# Patient Record
Sex: FEMALE | Race: WHITE | NOT HISPANIC OR LATINO | Employment: FULL TIME | URBAN - METROPOLITAN AREA
[De-identification: names, ages, dates, MRNs, and addresses within clinical notes are randomized per-mention and may not be internally consistent; named-entity substitution may affect disease eponyms.]

---

## 2017-01-03 ENCOUNTER — TELEPHONE (OUTPATIENT)
Dept: INTERNAL MEDICINE | Facility: CLINIC | Age: 37
End: 2017-01-03

## 2017-01-03 NOTE — TELEPHONE ENCOUNTER
----- Message from Chery Gore sent at 1/3/2017  1:11 PM EST -----  Contact: self  Pt stopped in office with paperwork that needs to be filled out. She can be reached at [phone]  Placed in mailbox

## 2017-02-02 RX ORDER — FLUCONAZOLE 150 MG/1
150 TABLET ORAL ONCE
Qty: 2 TABLET | Refills: 2 | Status: SHIPPED | OUTPATIENT
Start: 2017-02-02 | End: 2017-02-02

## 2017-02-02 RX ORDER — METRONIDAZOLE 500 MG/1
500 TABLET ORAL 2 TIMES DAILY
Qty: 14 TABLET | Refills: 0 | Status: SHIPPED | OUTPATIENT
Start: 2017-02-02 | End: 2017-03-03 | Stop reason: SDUPTHER

## 2017-02-02 NOTE — TELEPHONE ENCOUNTER
----- Message from Irish Fowler sent at 2/2/2017  9:35 AM EST -----  Contact: MAIK GALLEGOS PH:[phone]  MAIK GALLEGOS CALLING TO SEE IF SHE CAN GET SOMETHING CALLED IN FOR A YEAST INFECTION. SHE USES THE [facility] AND CAN BE REACHED [phone]

## 2017-02-02 NOTE — TELEPHONE ENCOUNTER
Diflucan 150 mg PO X 1 dose if at 72 hours symptoms not resolved take second dose #2 with 2 refills.

## 2017-02-03 ENCOUNTER — TELEPHONE (OUTPATIENT)
Dept: INTERNAL MEDICINE | Facility: CLINIC | Age: 37
End: 2017-02-03

## 2017-02-03 NOTE — TELEPHONE ENCOUNTER
----- Message from Irish Fowler sent at 2/3/2017 10:00 AM EST -----  Contact: MAIK GALLEGOS PH:[phone]  MAIK GALLEGOS WOULD LIKE TO KNOW WHERE THE LEAVE OF ABSENCE FORMS THAT ARE READY FOR  CAME FROM. SHE SAID THE TWO SETS OF FORMS SHE DROPPED OF SHE HAS ALREADY GOTTEN AND WAS WONDERING IF THESE WERE DIFFERENT FORMS THAT WERE POSSIBLE FAXED IN FROM HER WORK. SHE CAN BE REACHED [phone]

## 2017-02-03 NOTE — TELEPHONE ENCOUNTER
Informed pt that Im not sure if these are duplicates or different forms, but they are ready for her to come .

## 2017-02-16 DIAGNOSIS — G89.29 CHRONIC LOW BACK PAIN, UNSPECIFIED BACK PAIN LATERALITY, WITH SCIATICA PRESENCE UNSPECIFIED: ICD-10-CM

## 2017-02-16 DIAGNOSIS — M54.5 CHRONIC LOW BACK PAIN, UNSPECIFIED BACK PAIN LATERALITY, WITH SCIATICA PRESENCE UNSPECIFIED: ICD-10-CM

## 2017-02-16 DIAGNOSIS — M79.7 FIBROMYALGIA: ICD-10-CM

## 2017-02-16 NOTE — TELEPHONE ENCOUNTER
----- Message from Cale Cartwright MA sent at 2/16/2017 10:15 AM EST -----  Contact: [phone]  Patient calling for refill on [facility]armacy- [facility]

## 2017-03-01 RX ORDER — ONDANSETRON 4 MG/1
4 TABLET, FILM COATED ORAL EVERY 8 HOURS PRN
Qty: 30 TABLET | Refills: 5 | Status: SHIPPED | OUTPATIENT
Start: 2017-03-01 | End: 2018-03-01

## 2017-03-03 ENCOUNTER — TELEPHONE (OUTPATIENT)
Dept: INTERNAL MEDICINE | Facility: CLINIC | Age: 37
End: 2017-03-03

## 2017-03-03 RX ORDER — METRONIDAZOLE 500 MG/1
500 TABLET ORAL 2 TIMES DAILY
Qty: 14 TABLET | Refills: 0 | Status: SHIPPED | OUTPATIENT
Start: 2017-03-03 | End: 2017-04-19

## 2017-03-03 RX ORDER — FLUCONAZOLE 150 MG/1
150 TABLET ORAL ONCE
Qty: 2 TABLET | Refills: 1 | Status: SHIPPED | OUTPATIENT
Start: 2017-03-03 | End: 2017-03-03

## 2017-03-03 NOTE — TELEPHONE ENCOUNTER
----- Message from Yokasta Hernandez sent at 3/3/2017  2:36 PM EST -----  PT THINKS SHE IS GETTING VAGINAL INFECTION AGAIN AND WOULD LIKE DIFLUCAN AND ANTIBIOTIC SENT TO [facility].      [phone]

## 2017-03-03 NOTE — TELEPHONE ENCOUNTER
IF yeast, just take Diflucan 150 mg PO Daily X 1 dose wait 3 days and if symptoms not resolved take 2nd dose #2 with 1 refill.     IF she is trying to say bacterial vaginosis:     Flagyl  500 mg PO BID X 7 days with no refills.

## 2017-03-06 ENCOUNTER — TELEPHONE (OUTPATIENT)
Dept: INTERNAL MEDICINE | Facility: CLINIC | Age: 37
End: 2017-03-06

## 2017-03-06 NOTE — TELEPHONE ENCOUNTER
----- Message from Yokasta Hernandez sent at 3/6/2017 10:28 AM EST -----  Pt has dropped off disability form that needs to be renewed by 3/8/17. She states that he work gave it to her on Thursday and she has been in hospital with her Grandfather, life has been hectic. It is placed in mailbox.      [phone] (pt)

## 2017-03-07 NOTE — TELEPHONE ENCOUNTER
Lmov for pt to return call, office number given, please let pt know that her disability forms are ready for her to .

## 2017-03-17 DIAGNOSIS — M79.7 FIBROMYALGIA: ICD-10-CM

## 2017-03-17 DIAGNOSIS — G89.29 CHRONIC LOW BACK PAIN, UNSPECIFIED BACK PAIN LATERALITY, WITH SCIATICA PRESENCE UNSPECIFIED: ICD-10-CM

## 2017-03-17 DIAGNOSIS — M54.5 CHRONIC LOW BACK PAIN, UNSPECIFIED BACK PAIN LATERALITY, WITH SCIATICA PRESENCE UNSPECIFIED: ICD-10-CM

## 2017-04-19 ENCOUNTER — OFFICE VISIT (OUTPATIENT)
Dept: INTERNAL MEDICINE | Facility: CLINIC | Age: 37
End: 2017-04-19

## 2017-04-19 VITALS
BODY MASS INDEX: 25.53 KG/M2 | DIASTOLIC BLOOD PRESSURE: 84 MMHG | WEIGHT: 163 LBS | SYSTOLIC BLOOD PRESSURE: 130 MMHG | HEART RATE: 86 BPM | RESPIRATION RATE: 16 BRPM

## 2017-04-19 DIAGNOSIS — F41.9 ANXIETY: ICD-10-CM

## 2017-04-19 DIAGNOSIS — M54.5 CHRONIC LOW BACK PAIN, UNSPECIFIED BACK PAIN LATERALITY, WITH SCIATICA PRESENCE UNSPECIFIED: ICD-10-CM

## 2017-04-19 DIAGNOSIS — G47.9 DIFFICULTY SLEEPING: ICD-10-CM

## 2017-04-19 DIAGNOSIS — G43.019 COMMON MIGRAINE WITH INTRACTABLE MIGRAINE: Primary | ICD-10-CM

## 2017-04-19 DIAGNOSIS — G89.29 CHRONIC LOW BACK PAIN, UNSPECIFIED BACK PAIN LATERALITY, WITH SCIATICA PRESENCE UNSPECIFIED: ICD-10-CM

## 2017-04-19 DIAGNOSIS — M79.7 FIBROMYALGIA: ICD-10-CM

## 2017-04-19 PROCEDURE — 99214 OFFICE O/P EST MOD 30 MIN: CPT | Performed by: INTERNAL MEDICINE

## 2017-04-19 NOTE — PROGRESS NOTES
Subjective   Melissa Hinton is a 37 y.o. female.   Pt is here to follow up on migraines,chronic back pain,fibromyalgia,anxiety and difficulty sleeping.               History of Present Illness   Pt is here to follow up on migraines,chronic back pain,fibromyalgia,anxiety and difficulty sleeping.   She states that all chronic issues are doing well with no acute complaints.               The following portions of the patient's history were reviewed and updated as appropriate: allergies, current medications, past family history, past medical history, past social history, past surgical history and problem list.             Review of Systems   Constitutional: Negative.    HENT: Negative.    Eyes: Negative.    Respiratory: Negative.    Cardiovascular: Negative.    Gastrointestinal: Negative.    Endocrine: Negative.    Genitourinary: Negative.    Musculoskeletal:        See HPI.    Skin: Negative.    Neurological:        See HPI.   Hematological: Negative.    Psychiatric/Behavioral:        See HPI.                 Objective   Physical Exam   Constitutional: She is oriented to person, place, and time. She appears well-developed and well-nourished.   HENT:   Head: Normocephalic and atraumatic.   Right Ear: External ear normal.   Left Ear: External ear normal.   Nose: Nose normal.   Mouth/Throat: Oropharynx is clear and moist.   Eyes: Conjunctivae and EOM are normal. Pupils are equal, round, and reactive to light.   Neck: Normal range of motion. Neck supple. No tracheal deviation present. No thyromegaly present.   Cardiovascular: Normal rate, regular rhythm, normal heart sounds and intact distal pulses.    Pulmonary/Chest: Effort normal and breath sounds normal.   Abdominal: Soft. Bowel sounds are normal. She exhibits no distension. There is no tenderness.   Neurological: She is alert and oriented to person, place, and time. She has normal reflexes.   Skin: Skin is warm and dry.   Psychiatric: She has a normal mood and affect.    Nursing note and vitals reviewed.             Assessment/Plan    Common migraine with intractable migraine    Chronic low back pain, unspecified back pain laterality, with sciatica presence unspecified  -     Cancel: Urine Drug Screen  -     pentazocine-naloxone (TALWIN NX) 50-0.5 MG per tablet; Take 1 tablet by mouth Every 6 (Six) Hours As Needed for Mild Pain (1-3).    Fibromyalgia  -     pentazocine-naloxone (TALWIN NX) 50-0.5 MG per tablet; Take 1 tablet by mouth Every 6 (Six) Hours As Needed for Mild Pain (1-3).    Anxiety  -     Cancel: Urine Drug Screen  -     Cancel: Urine Drug Screen    Difficulty sleeping      1.) Refill chronic meds   2.) UDS   3.) Follow up in 3 months

## 2017-05-18 ENCOUNTER — TELEPHONE (OUTPATIENT)
Dept: INTERNAL MEDICINE | Facility: CLINIC | Age: 37
End: 2017-05-18

## 2017-05-24 RX ORDER — PROPRANOLOL HYDROCHLORIDE 20 MG/1
TABLET ORAL
Qty: 90 TABLET | Refills: 3 | Status: SHIPPED | OUTPATIENT
Start: 2017-05-24 | End: 2017-11-07

## 2017-06-13 ENCOUNTER — TELEPHONE (OUTPATIENT)
Dept: INTERNAL MEDICINE | Facility: CLINIC | Age: 37
End: 2017-06-13

## 2017-06-13 DIAGNOSIS — M79.7 FIBROMYALGIA: ICD-10-CM

## 2017-06-13 DIAGNOSIS — G89.29 CHRONIC LOW BACK PAIN, UNSPECIFIED BACK PAIN LATERALITY, WITH SCIATICA PRESENCE UNSPECIFIED: ICD-10-CM

## 2017-06-13 DIAGNOSIS — M54.5 CHRONIC LOW BACK PAIN, UNSPECIFIED BACK PAIN LATERALITY, WITH SCIATICA PRESENCE UNSPECIFIED: ICD-10-CM

## 2017-06-13 NOTE — TELEPHONE ENCOUNTER
----- Message from Peg Steele sent at 6/13/2017  9:48 AM EDT -----  PT CALLING FOR RX REFILL pentazocine-naloxone (TALWIN NX) 50-0.5 MG per tablet    [phone]    [facility] - [address] - [phone] PH - [phone] FX

## 2017-06-20 ENCOUNTER — TELEPHONE (OUTPATIENT)
Dept: INTERNAL MEDICINE | Facility: CLINIC | Age: 37
End: 2017-06-20

## 2017-06-20 NOTE — TELEPHONE ENCOUNTER
S/W patient and informed her that we do not routinely call in antibiotics without being seen.  Patient states you have treated her in the past without being seen.  I reviewed the patient's chart and informed her that Dr. Nelson had treated her vaginosis and the medication she prescribed covered her sinus infection as well which she had previously been seen for in the office.  I informed the patient that one of the PA's could see her this evening but she insisted she was too busy to come in and be seen and she wanted to wait until Dr. Nelson returned to the office because she said she has treated her sinus infections in the past without being seen.

## 2017-06-20 NOTE — TELEPHONE ENCOUNTER
----- Message from Stefany Kelly sent at 6/20/2017 12:49 PM EDT -----  Pt [phone]  PT HAS A SINUS INFECTION AND WOULD LIKE AN ANTIBIOTIC CALLED IN AND ALSO JOSHUA CALLED IN TO [facility] [place]

## 2017-07-10 ENCOUNTER — TELEPHONE (OUTPATIENT)
Dept: INTERNAL MEDICINE | Facility: CLINIC | Age: 37
End: 2017-07-10

## 2017-07-10 DIAGNOSIS — G89.29 CHRONIC LOW BACK PAIN, UNSPECIFIED BACK PAIN LATERALITY, WITH SCIATICA PRESENCE UNSPECIFIED: ICD-10-CM

## 2017-07-10 DIAGNOSIS — M54.5 CHRONIC LOW BACK PAIN, UNSPECIFIED BACK PAIN LATERALITY, WITH SCIATICA PRESENCE UNSPECIFIED: ICD-10-CM

## 2017-07-10 DIAGNOSIS — M79.7 FIBROMYALGIA: ICD-10-CM

## 2017-07-10 NOTE — TELEPHONE ENCOUNTER
Patient returned call.  I notified rx had been called in.  She verbalized understanding and appreciation.

## 2017-07-10 NOTE — TELEPHONE ENCOUNTER
----- Message from Chery Gore sent at 7/10/2017  1:53 PM EDT -----  PT CALLED NEEDING REFILL ON RX TALWIN   SHE CAN BE REACHED [phone]    PHARMACY- [facility]

## 2017-07-24 ENCOUNTER — TELEPHONE (OUTPATIENT)
Dept: INTERNAL MEDICINE | Facility: CLINIC | Age: 37
End: 2017-07-24

## 2017-07-24 NOTE — TELEPHONE ENCOUNTER
----- Message from Irish Fowler sent at 7/24/2017  2:33 PM EDT -----  Contact: [facility]  CHIDI FROM MY LEAVE CALLING IN REGARDS TO MAIK GALLEGOS. THEY NEED THE OFFICE NOTES FROM June ON TO EXTEND HER SHORT TERM DISABILITY   [phone]  CASE # [id number]  CAN PUT ATTENTION TO CASE #

## 2017-07-25 NOTE — TELEPHONE ENCOUNTER
PT HASNT BEEN SEEN SINCE APRIL. I BELIEVE THIS HAS ALREADY BEEN FAXED TO HER SHORT TERM DISABILITY

## 2017-07-26 NOTE — TELEPHONE ENCOUNTER
? She called back again can you find out what she wants. I thought we had this straightened out and Dr. Valenzuela was going to fill this out.

## 2017-07-26 NOTE — TELEPHONE ENCOUNTER
Since you were helping me with this yesterday, do you know the phone number to contact them?? Please advise

## 2017-08-03 ENCOUNTER — TELEPHONE (OUTPATIENT)
Dept: INTERNAL MEDICINE | Facility: CLINIC | Age: 37
End: 2017-08-03

## 2017-08-03 NOTE — TELEPHONE ENCOUNTER
----- Message from Stefany Kelly sent at 8/3/2017  9:27 AM EDT -----  Pt [phone]  PT IS HAVING BACK SURGERY AND THE OFFICE WAS SUPPOSE TO FAX SOMETHING OVER TO YOU YESTERDAY , CALL PT TO DISCUSS

## 2017-08-03 NOTE — TELEPHONE ENCOUNTER
Contacted pt and explained that we do not have the papers. I explained that the best we could do is wait til Monday and have the office refax the papers back to us. Pt stated she understood and will be calling Monday to get the papers sent over.

## 2017-08-03 NOTE — TELEPHONE ENCOUNTER
Pt states that Dr. Harrison office were suppose to fax a form for clearance for pt to have surgery and to go under general anesthesia. I contacted pt and explained that I spoke with Billie since she worked with Dr. Nelson yesterday and had not seen a fax come in yesterday. Pt stated that she cannot call Dr. Harrison office because they are closed today and tomorrow. I explained that I would send this message back to Dr. Nelson and see if there is something we can do or if she's seen the form and give the pt a call back. Pt stated she understood and was thankful.

## 2017-09-06 ENCOUNTER — TELEPHONE (OUTPATIENT)
Dept: INTERNAL MEDICINE | Facility: CLINIC | Age: 37
End: 2017-09-06

## 2017-09-06 NOTE — TELEPHONE ENCOUNTER
----- Message from Yokasta Hernandez sent at 9/6/2017 11:53 AM EDT -----  PT NEEDS ANTIBIOTIC CALLED IN FOR SINUS INFECTION.   PT IS HAVING BACK SURGERY AND NEEDS A FORM COMPLETED TO [facility] GENERAL ANESTHESIA DR. ANITA GEORGE [phone]    PHARMACY: [facility]    PATIENT: [phone]

## 2017-09-06 NOTE — TELEPHONE ENCOUNTER
Pt informed that she will need to be seen before abx will be sent to pharm. Verbal understanding received. Pt stated that she will call back to sched appt.

## 2017-10-04 ENCOUNTER — TELEPHONE (OUTPATIENT)
Dept: INTERNAL MEDICINE | Facility: CLINIC | Age: 37
End: 2017-10-04

## 2017-10-04 NOTE — TELEPHONE ENCOUNTER
----- Message from Chery Jimenez sent at 10/4/2017 11:57 AM EDT -----  PT CALLED AND STATED SHE NEEDS A REFILL ON RX TALWIN  SHE CAN BE REACHED [phone]    PHARMACY- [facility]

## 2017-10-04 NOTE — TELEPHONE ENCOUNTER
----- Message from Kassidy Pope sent at 10/4/2017  4:13 PM EDT -----  Patient has been trying to get a form signed to get cleared for anesthesia since 8/3/17.  Dr. Valenzuela's office is going to send this to us again.  Patient wants to know if Bobbi Schreiber will fill this out and clear her for anesthesia based on April 2017 visit or will patient have to come in and be seen?  Call patient at [phone].

## 2017-10-04 NOTE — TELEPHONE ENCOUNTER
Pt needs UDS, SNEHAL and CSA. Pt last seen 04/2017. Was supposed to f/u in 3 months. Do you want pt to have appt in office before refills?

## 2017-10-04 NOTE — TELEPHONE ENCOUNTER
Pls let her know that we cannot refill talwin until she is seen in our office. By law, controlled substances require an appt every 3 months.  (FYI her UDS was positive for herion last time)    Fiona,  pls call Dr Valenzuela's office and confirm that she needs pre op physical.  Have them fax the last office note as well.

## 2017-10-05 NOTE — TELEPHONE ENCOUNTER
Called Dr. Valenzuela's office and requested last office note as well as confirming that pt will be needing a pre op physical. Office will be faxing over last office visit notes and pre op forms to fax # [phone]. Called pt LVM for her to return my call regarding Rx refill request.

## 2017-10-05 NOTE — TELEPHONE ENCOUNTER
Pt notified that Rx Naldo will not be able to get refilled and that she will be needing to find another PCP within the next couple of weeks before Bobbi leaves. Pt states that she will call the office back and get scheduled with Bobbi.

## 2017-10-13 NOTE — TELEPHONE ENCOUNTER
The office has not received forms yet. Dr. Valenzuela's office is closed at this time and will call office again on Monday. Called pt LVM to return call. Office # given.

## 2017-10-30 ENCOUNTER — TELEPHONE (OUTPATIENT)
Dept: INTERNAL MEDICINE | Facility: CLINIC | Age: 37
End: 2017-10-30

## 2017-10-30 NOTE — TELEPHONE ENCOUNTER
----- Message from Aileen Valdez MA sent at 10/30/2017 12:02 PM EDT -----  Josiah with Dr. Valenzuela's office called regarding pt.  Needs clearance stating it is ok for her to go under local ANES for minor surgery.  Pt saw Dr. Nelson but has not established care elsewhere.  Notified that I was not able to schedule with another provider as they are not accepting new patients.  States pt lives in [place] and would be most convenient.  Notified I was not able to schedule with another provider at this location and offered other locations for pt to be seen.  Would like a call back from practice manager to discuss.   Josiah: [phone]

## 2017-10-30 NOTE — TELEPHONE ENCOUNTER
I called Dr. Valenzuela's office to obtain the preop clearance paperwork and they said they don't have any forms to complete.  They just need a letter stating the patient is cleared for surgery/anesthesia.  Patient is scheduled for a preop physical November 7th with you.

## 2017-10-30 NOTE — TELEPHONE ENCOUNTER
Scheduled preop with Dr. Mukherjee for next Tuesday.  Informed patient to bring preop paperwork with her.  Also informed patient that she needs to begin looking for a new PCP since Dr. Nelson is no longer with the practice.

## 2017-10-30 NOTE — TELEPHONE ENCOUNTER
Patient needs to schedule a pre-op physical to have pre-op papers filled out.  That will also serve as her med check (CSA, UDS can be done then).  Bobbi Schreiber has left the office.  Can schedule the pre-op with whichever provider in our office has the earliest availability.  Please make sure the patient is actively looking for another PCP.

## 2017-10-31 DIAGNOSIS — G43.019 COMMON MIGRAINE WITH INTRACTABLE MIGRAINE: ICD-10-CM

## 2017-10-31 RX ORDER — NARATRIPTAN 2.5 MG/1
TABLET ORAL
Qty: 9 TABLET | Refills: 0 | Status: SHIPPED | OUTPATIENT
Start: 2017-10-31 | End: 2017-11-07

## 2017-11-07 ENCOUNTER — OFFICE VISIT (OUTPATIENT)
Dept: INTERNAL MEDICINE | Facility: CLINIC | Age: 37
End: 2017-11-07

## 2017-11-07 VITALS
BODY MASS INDEX: 22.44 KG/M2 | SYSTOLIC BLOOD PRESSURE: 126 MMHG | TEMPERATURE: 99.1 F | HEIGHT: 67 IN | DIASTOLIC BLOOD PRESSURE: 72 MMHG | WEIGHT: 143 LBS | HEART RATE: 96 BPM | RESPIRATION RATE: 20 BRPM

## 2017-11-07 DIAGNOSIS — Z01.818 PRE-OP EVALUATION: Primary | ICD-10-CM

## 2017-11-07 DIAGNOSIS — Z72.0 TOBACCO USE: ICD-10-CM

## 2017-11-07 DIAGNOSIS — M54.5 CHRONIC LOW BACK PAIN, UNSPECIFIED BACK PAIN LATERALITY, WITH SCIATICA PRESENCE UNSPECIFIED: ICD-10-CM

## 2017-11-07 DIAGNOSIS — G89.29 CHRONIC LOW BACK PAIN, UNSPECIFIED BACK PAIN LATERALITY, WITH SCIATICA PRESENCE UNSPECIFIED: ICD-10-CM

## 2017-11-07 DIAGNOSIS — D64.9 ANEMIA, UNSPECIFIED TYPE: ICD-10-CM

## 2017-11-07 LAB
ALBUMIN SERPL-MCNC: 3.9 G/DL (ref 3.2–4.8)
ALBUMIN/GLOB SERPL: 1.1 G/DL (ref 1.5–2.5)
ALP SERPL-CCNC: 81 U/L (ref 25–100)
ALT SERPL W P-5'-P-CCNC: 9 U/L (ref 7–40)
ANION GAP SERPL CALCULATED.3IONS-SCNC: 2 MMOL/L (ref 3–11)
AST SERPL-CCNC: 11 U/L (ref 0–33)
BASOPHILS # BLD AUTO: 0.03 10*3/MM3 (ref 0–0.2)
BASOPHILS NFR BLD AUTO: 0.7 % (ref 0–1)
BILIRUB SERPL-MCNC: 0.2 MG/DL (ref 0.3–1.2)
BUN BLD-MCNC: 7 MG/DL (ref 9–23)
BUN/CREAT SERPL: 10 (ref 7–25)
CALCIUM SPEC-SCNC: 8.8 MG/DL (ref 8.7–10.4)
CHLORIDE SERPL-SCNC: 110 MMOL/L (ref 99–109)
CO2 SERPL-SCNC: 26 MMOL/L (ref 20–31)
CREAT BLD-MCNC: 0.7 MG/DL (ref 0.6–1.3)
DEPRECATED RDW RBC AUTO: 45.1 FL (ref 37–54)
EOSINOPHIL # BLD AUTO: 0.09 10*3/MM3 (ref 0–0.3)
EOSINOPHIL NFR BLD AUTO: 2 % (ref 0–3)
ERYTHROCYTE [DISTWIDTH] IN BLOOD BY AUTOMATED COUNT: 17.6 % (ref 11.3–14.5)
GFR SERPL CREATININE-BSD FRML MDRD: 94 ML/MIN/1.73
GLOBULIN UR ELPH-MCNC: 3.5 GM/DL
GLUCOSE BLD-MCNC: 94 MG/DL (ref 70–100)
HCT VFR BLD AUTO: 32 % (ref 34.5–44)
HGB BLD-MCNC: 9.2 G/DL (ref 11.5–15.5)
IMM GRANULOCYTES # BLD: 0.01 10*3/MM3 (ref 0–0.03)
IMM GRANULOCYTES NFR BLD: 0.2 % (ref 0–0.6)
LYMPHOCYTES # BLD AUTO: 1.18 10*3/MM3 (ref 0.6–4.8)
LYMPHOCYTES NFR BLD AUTO: 26.4 % (ref 24–44)
MCH RBC QN AUTO: 20.1 PG (ref 27–31)
MCHC RBC AUTO-ENTMCNC: 28.8 G/DL (ref 32–36)
MCV RBC AUTO: 70 FL (ref 80–99)
MONOCYTES # BLD AUTO: 0.33 10*3/MM3 (ref 0–1)
MONOCYTES NFR BLD AUTO: 7.4 % (ref 0–12)
NEUTROPHILS # BLD AUTO: 2.83 10*3/MM3 (ref 1.5–8.3)
NEUTROPHILS NFR BLD AUTO: 63.3 % (ref 41–71)
PLAT MORPH BLD: NORMAL
PLATELET # BLD AUTO: 380 10*3/MM3 (ref 150–450)
PMV BLD AUTO: 9.9 FL (ref 6–12)
POTASSIUM BLD-SCNC: 3.9 MMOL/L (ref 3.5–5.5)
PROT SERPL-MCNC: 7.4 G/DL (ref 5.7–8.2)
RBC # BLD AUTO: 4.57 10*6/MM3 (ref 3.89–5.14)
SODIUM BLD-SCNC: 138 MMOL/L (ref 132–146)
STOMATOCYTES BLD QL SMEAR: NORMAL
WBC MORPH BLD: NORMAL
WBC NRBC COR # BLD: 4.47 10*3/MM3 (ref 3.5–10.8)

## 2017-11-07 PROCEDURE — 85025 COMPLETE CBC W/AUTO DIFF WBC: CPT | Performed by: INTERNAL MEDICINE

## 2017-11-07 PROCEDURE — 85007 BL SMEAR W/DIFF WBC COUNT: CPT | Performed by: INTERNAL MEDICINE

## 2017-11-07 PROCEDURE — 99214 OFFICE O/P EST MOD 30 MIN: CPT | Performed by: INTERNAL MEDICINE

## 2017-11-07 PROCEDURE — 80053 COMPREHEN METABOLIC PANEL: CPT | Performed by: INTERNAL MEDICINE

## 2017-11-07 NOTE — PATIENT INSTRUCTIONS
Steps to Quit Smoking   Smoking tobacco can be harmful to your health and can affect almost every organ in your body. Smoking puts you, and those around you, at risk for developing many serious chronic diseases. Quitting smoking is difficult, but it is one of the best things that you can do for your health. It is never too late to quit.  WHAT ARE THE BENEFITS OF QUITTING SMOKING?  When you quit smoking, you lower your risk of developing serious diseases and conditions, such as:  · Lung cancer or lung disease, such as COPD.  · Heart disease.  · Stroke.  · Heart attack.  · Infertility.  · Osteoporosis and bone fractures.  Additionally, symptoms such as coughing, wheezing, and shortness of breath may get better when you quit. You may also find that you get sick less often because your body is stronger at fighting off colds and infections. If you are pregnant, quitting smoking can help to reduce your chances of having a baby of low birth weight.  HOW DO I GET READY TO QUIT?  When you decide to quit smoking, create a plan to make sure that you are successful. Before you quit:  · Pick a date to quit. Set a date within the next two weeks to give you time to prepare.  · Write down the reasons why you are quitting. Keep this list in places where you will see it often, such as on your bathroom mirror or in your car or wallet.  · Identify the people, places, things, and activities that make you want to smoke (triggers) and avoid them. Make sure to take these actions:    Throw away all cigarettes at home, at work, and in your car.    Throw away smoking accessories, such as ashtrays and lighters.    Clean your car and make sure to empty the ashtray.    Clean your home, including curtains and carpets.  · Tell your family, friends, and coworkers that you are quitting. Support from your loved ones can make quitting easier.  · Talk with your health care provider about your options for quitting smoking.  · Find out what treatment  "options are covered by your health insurance.  WHAT STRATEGIES CAN I USE TO QUIT SMOKING?   Talk with your healthcare provider about different strategies to quit smoking. Some strategies include:  · Quitting smoking altogether instead of gradually lessening how much you smoke over a period of time. Research shows that quitting \"cold turkey\" is more successful than gradually quitting.  · Attending in-person counseling to help you build problem-solving skills. You are more likely to have success in quitting if you attend several counseling sessions. Even short sessions of 10 minutes can be effective.  · Finding resources and support systems that can help you to quit smoking and remain smoke-free after you quit. These resources are most helpful when you use them often. They can include:    Online chats with a counselor.    Telephone quitlines.    Printed self-help materials.    Support groups or group counseling.    Text messaging programs.    Mobile phone applications.  · Taking medicines to help you quit smoking. (If you are pregnant or breastfeeding, talk with your health care provider first.) Some medicines contain nicotine and some do not. Both types of medicines help with cravings, but the medicines that include nicotine help to relieve withdrawal symptoms. Your health care provider may recommend:    Nicotine patches, gum, or lozenges.    Nicotine inhalers or sprays.    Non-nicotine medicine that is taken by mouth.  Talk with your health care provider about combining strategies, such as taking medicines while you are also receiving in-person counseling. Using these two strategies together makes you more likely to succeed in quitting than if you used either strategy on its own.  If you are pregnant or breastfeeding, talk with your health care provider about finding counseling or other support strategies to quit smoking. Do not take medicine to help you quit smoking unless told to do so by your health care " provider.  WHAT THINGS CAN I DO TO MAKE IT EASIER TO QUIT?  Quitting smoking might feel overwhelming at first, but there is a lot that you can do to make it easier. Take these important actions:  · Reach out to your family and friends and ask that they support and encourage you during this time. Call telephone quitlines, reach out to support groups, or work with a counselor for support.  · Ask people who smoke to avoid smoking around you.  · Avoid places that trigger you to smoke, such as bars, parties, or smoke-break areas at work.  · Spend time around people who do not smoke.  · Lessen stress in your life, because stress can be a smoking trigger for some people. To lessen stress, try:    Exercising regularly.    Deep-breathing exercises.    Yoga.    Meditating.    Performing a body scan. This involves closing your eyes, scanning your body from head to toe, and noticing which parts of your body are particularly tense. Purposefully relax the muscles in those areas.  · Download or purchase mobile phone or tablet apps (applications) that can help you stick to your quit plan by providing reminders, tips, and encouragement. There are many free apps, such as QuitGuide from the CDC (Centers for Disease Control and Prevention). You can find other support for quitting smoking (smoking cessation) through smokefree.gov and other websites.  HOW WILL I FEEL WHEN I QUIT SMOKING?  Within the first 24 hours of quitting smoking, you may start to feel some withdrawal symptoms. These symptoms are usually most noticeable 2-3 days after quitting, but they usually do not last beyond 2-3 weeks. Changes or symptoms that you might experience include:  · Mood swings.  · Restlessness, anxiety, or irritation.  · Difficulty concentrating.  · Dizziness.  · Strong cravings for sugary foods in addition to nicotine.  · Mild weight gain.  · Constipation.  · Nausea.  · Coughing or a sore throat.  · Changes in how your medicines work in your  body.  · A depressed mood.  · Difficulty sleeping (insomnia).  After the first 2-3 weeks of quitting, you may start to notice more positive results, such as:  · Improved sense of smell and taste.  · Decreased coughing and sore throat.  · Slower heart rate.  · Lower blood pressure.  · Clearer skin.  · The ability to breathe more easily.  · Fewer sick days.  Quitting smoking is very challenging for most people. Do not get discouraged if you are not successful the first time. Some people need to make many attempts to quit before they achieve long-term success. Do your best to stick to your quit plan, and talk with your health care provider if you have any questions or concerns.     This information is not intended to replace advice given to you by your health care provider. Make sure you discuss any questions you have with your health care provider.     Document Released: 12/12/2002 Document Revised: 05/03/2016 Document Reviewed: 05/03/2016  [employer] Interactive Patient Education ©2017 [employer]

## 2017-11-07 NOTE — PROGRESS NOTES
Pre-Op Visit (Brief): The patient is being seen for a pre-operative visit.  She was a patient of Dr. Nelson.  The procedure is a minor back surgical procedure (per patient), date not yet scheduled, with Dr. Valenzuela.  The indication for surgery is Back Pain.   The procedure is to be performed under Local Anesthesia.    History obtained from the patient.       Surgical Risk Assessment:     Prior Anesthesia:  She had prior anesthesia and no prior adverse reaction to general anesthesia.     Pertinent Past Medical History: She has a history of Iron Deficiency Anemia.  No HTN, CAD, CHF, NIDDM, CVA, Asthma, COPD,  SHIRA, or Renal Disease. Does not use anticoagulants.    Exercise Capacity: able to walk four blocks without symptoms and able to walk two flights of stairs without symptoms.     Lifestyle Factors: smokes 1/2-1 ppd 1999 to present.  denies alcohol use and denies illegal drug use.    Symptoms: no easy bleeding, no easy bruising, no frequent nosebleeds, no chest pain, no cough, no dyspnea, no edema, no palpitations and no wheezing.     Pertinent Family History: No ischemic heart disease,  no family history of an adverse reaction to anesthesia, no aneurysm, no bleeding problems, no sudden early deaths, and no stroke.     Living Situation: home is secure and supportive and no post-op concerns with his living situation.       Patient Active Problem List   Diagnosis   • Anemia   • Anxiety   • Chronic back pain   • Common migraine with intractable migraine   • Fibromyalgia   • Knee locking   • Difficulty sleeping       Current Outpatient Prescriptions on File Prior to Visit   Medication Sig Dispense Refill   • amitriptyline (ELAVIL) 100 MG tablet Take 1 tablet by mouth every night. (Patient taking differently: Take 150 mg by mouth Every Night.) 30 tablet 5   • diazepam (VALIUM) 2 MG tablet Take 2 mg by mouth 3 (Three) Times a Day As Needed for Anxiety.     • methocarbamol (ROBAXIN) 500 MG tablet Take 500 mg by mouth as  needed for muscle spasms.     • naratriptan (AMERGE) 2.5 MG tablet Take 1 tablet by mouth 1 (one) time as needed for migraine for up to 1 dose. 9 tablet 11   • ondansetron (ZOFRAN) 4 MG tablet Take 1 tablet by mouth Every 8 (Eight) Hours As Needed for nausea or vomiting. 30 tablet 5   • [DISCONTINUED] gabapentin (NEURONTIN) 800 MG tablet Take 800 mg by mouth 4 (Four) Times a Day.     • [DISCONTINUED] naratriptan (AMERGE) 2.5 MG tablet TAKE 1 TABLET BY MOUTH 1 (ONE) TIME AS NEEDED FOR MIGRAINE FOR UP TO 1 DOSE. 9 tablet 0   • [DISCONTINUED] pentazocine-naloxone (TALWIN NX) 50-0.5 MG per tablet Take 1 tablet by mouth Every 6 (Six) Hours As Needed for Mild Pain . 120 tablet 2   • [DISCONTINUED] propranolol (INDERAL) 20 MG tablet TAKE 1 TABLET BY MOUTH THREE TIMES DAILY 90 tablet 3     No current facility-administered medications on file prior to visit.        Allergies   Allergen Reactions   • Keflex [Cephalexin]    • Nsaids    • Tylenol With Codeine #3 [Acetaminophen-Codeine]    • Ultram [Tramadol]        Past Surgical History:   Procedure Laterality Date   • CHOLECYSTECTOMY         Family History   Problem Relation Age of Onset   • Arthritis Mother    • Hypertension Mother    • Hypertension Father    • Allergies Other    • Anemia Other    • Anxiety disorder Other    • Cancer Other      BREAST; LUNG   • Stroke Other    • Depression Other    • Hypertension Other    • Suicide Attempts Other    • Diabetes Other    • Colon cancer Other        Social History     Social History   • Marital status:      Spouse name: N/A   • Number of children: N/A   • Years of education: N/A     Occupational History   • Not on file.     Social History Main Topics   • Smoking status: Current Every Day Smoker   • Smokeless tobacco: Not on file   • Alcohol use No   • Drug use: Not on file   • Sexual activity: Not on file     Other Topics Concern   • Not on file     Social History Narrative       Review of Systems   Constitutional:  "Negative for chills, fatigue and fever.   HENT: Negative for congestion, dental problem, postnasal drip, rhinorrhea and sneezing.         Denies snoring.   Respiratory: Negative for cough, shortness of breath and wheezing.    Cardiovascular: Negative for chest pain, palpitations and leg swelling.        No TENORIO, orthopnea, PND, or claudication.   Gastrointestinal: Negative for abdominal pain, blood in stool, diarrhea, nausea and vomiting.   Endocrine: Negative for polydipsia and polyuria.   Genitourinary: Negative for dysuria, frequency, hematuria and urgency.   Musculoskeletal: Negative for arthralgias and myalgias.   Neurological: Negative for dizziness, syncope, weakness, light-headedness, numbness and headaches.       PHYSICAL EXAM:    /72 (BP Location: Right arm)  Pulse 96  Temp 99.1 °F (37.3 °C) (Temporal Artery )   Resp 20  Ht 67.25\" (170.8 cm)  Wt 143 lb (64.9 kg)  BMI 22.23 kg/m2    Physical Exam   Constitutional: She appears well-developed and well-nourished.   HENT:   Head: Normocephalic and atraumatic.   Right Ear: Tympanic membrane and ear canal normal.   Left Ear: Tympanic membrane and ear canal normal.   Mouth/Throat: Oropharynx is clear and moist.   Eyes: Conjunctivae are normal. Pupils are equal, round, and reactive to light.   Neck: Normal range of motion. Neck supple. No thyromegaly present.   Cardiovascular: Normal rate, regular rhythm, normal heart sounds and intact distal pulses.    No murmur heard.  Pulmonary/Chest: Effort normal and breath sounds normal.   Abdominal: Soft. Bowel sounds are normal. She exhibits no distension and no mass. There is no hepatomegaly. There is no tenderness. There is no CVA tenderness.   Musculoskeletal: Normal range of motion.   Lymphadenopathy:     She has no cervical adenopathy.   Neurological: She is alert. She has normal reflexes. No cranial nerve deficit.   Muscle strength 5/5 and equal throughout.   Skin: No rash noted.   Psychiatric: She has a " normal mood and affect.   Nursing note and vitals reviewed.      Assessment / Plan:     Diagnoses and all orders for this visit:    Pre-op evaluation    Chronic low back pain, unspecified back pain laterality, with sciatica presence unspecified    Anemia, unspecified type  -     CBC & Differential  -     Comprehensive Metabolic Panel  -     CBC Auto Differential  -     Scan Slide; Future  -     Scan Slide    Tobacco use          Medical Clearance:  The patient is an acceptable medical risk for the proposed surgical procedure and anesthesia.

## 2017-11-09 ENCOUNTER — TELEPHONE (OUTPATIENT)
Dept: INTERNAL MEDICINE | Facility: CLINIC | Age: 37
End: 2017-11-09

## 2017-11-13 ENCOUNTER — TELEPHONE (OUTPATIENT)
Dept: INTERNAL MEDICINE | Facility: CLINIC | Age: 37
End: 2017-11-13

## 2017-11-13 NOTE — TELEPHONE ENCOUNTER
She was calling to see if the preop papers were faxed to Dr Valenzuela office.   Notified her that Kassidy faxed them on 11/7/2017 and to contact Dr Harrison office.  Verb understanding given

## 2017-11-13 NOTE — TELEPHONE ENCOUNTER
----- Message from Stefany Kelly sent at 11/13/2017 10:42 AM EST -----  Pt [phone]  PT IS CALLING ABOUT PAPERWORK THAT WE WERE FILLING OUT FOR BACK SURGERY

## 2017-11-13 NOTE — TELEPHONE ENCOUNTER
Faxed progress note & labs DOS 11/7/17 to Dr. Valenzuela thru [facility] at fax #[phone]

## 2017-11-21 PROBLEM — B18.1 CHRONIC HEPATITIS B (HCC): Status: ACTIVE | Noted: 2017-11-21

## 2017-11-21 PROBLEM — Z86.2 H/O BLEEDING DISORDER: Status: ACTIVE | Noted: 2017-11-21

## 2017-11-21 PROBLEM — K44.9 HIATAL HERNIA: Status: ACTIVE | Noted: 2017-11-21

## 2017-11-21 PROBLEM — Z72.0 TOBACCO ABUSE: Status: ACTIVE | Noted: 2017-11-21

## 2017-11-21 PROBLEM — K21.9 GASTRIC REFLUX: Status: ACTIVE | Noted: 2017-11-21

## 2018-01-31 ENCOUNTER — OFFICE VISIT (OUTPATIENT)
Dept: NEUROLOGY | Facility: CLINIC | Age: 38
End: 2018-01-31

## 2018-01-31 VITALS
HEIGHT: 68 IN | SYSTOLIC BLOOD PRESSURE: 110 MMHG | BODY MASS INDEX: 21.52 KG/M2 | WEIGHT: 142 LBS | DIASTOLIC BLOOD PRESSURE: 70 MMHG

## 2018-01-31 DIAGNOSIS — G43.019 COMMON MIGRAINE WITH INTRACTABLE MIGRAINE: Primary | ICD-10-CM

## 2018-01-31 PROCEDURE — 99214 OFFICE O/P EST MOD 30 MIN: CPT | Performed by: PSYCHIATRY & NEUROLOGY

## 2018-01-31 RX ORDER — ONDANSETRON 4 MG/1
TABLET, ORALLY DISINTEGRATING ORAL
COMMUNITY
Start: 2018-01-17 | End: 2020-03-02

## 2018-01-31 RX ORDER — PROPRANOLOL HYDROCHLORIDE 20 MG/1
TABLET ORAL
Qty: 120 TABLET | Refills: 5 | OUTPATIENT
Start: 2018-01-31 | End: 2020-03-02

## 2018-01-31 RX ORDER — AMITRIPTYLINE HYDROCHLORIDE 50 MG/1
TABLET, FILM COATED ORAL
COMMUNITY
Start: 2018-01-09 | End: 2018-01-31 | Stop reason: SDUPTHER

## 2018-01-31 RX ORDER — SUMATRIPTAN 6 MG/.5ML
6 INJECTION, SOLUTION SUBCUTANEOUS ONCE
Qty: 6 VIAL | Refills: 5 | Status: SHIPPED | OUTPATIENT
Start: 2018-01-31 | End: 2018-01-31

## 2018-01-31 RX ORDER — AMITRIPTYLINE HYDROCHLORIDE 150 MG/1
150 TABLET, FILM COATED ORAL NIGHTLY
Qty: 30 TABLET | Refills: 11 | Status: SHIPPED | OUTPATIENT
Start: 2018-01-31 | End: 2020-03-24

## 2018-01-31 RX ORDER — ELETRIPTAN HYDROBROMIDE 40 MG/1
40 TABLET, FILM COATED ORAL AS NEEDED
Qty: 30 TABLET | Refills: 2 | Status: SHIPPED | OUTPATIENT
Start: 2018-01-31 | End: 2019-01-31

## 2018-01-31 RX ORDER — METHOCARBAMOL 750 MG/1
TABLET, FILM COATED ORAL
COMMUNITY
Start: 2018-01-09 | End: 2020-03-02

## 2018-01-31 NOTE — PROGRESS NOTES
Subjective   Melissa Hinton is a 37 y.o. female.     Chief Complaint   Patient presents with   • Migraine       History of Present Illness   Patient followed since 11/14 for migraines starting in childhood often triggered by stress, accompanied by n/v. Typically start at base of skull and move forward bilaterally with severe throbbing pain, P/Pand difficulty focusing, lasting hours to a few days. Has had right arm numbness with it and sometimes spots in vision during the headache. Has used Phenergan, helpful for nausea but not the headache. Added naratriptan.  9/16 noted: Started propranolol for prophylaxis and then her psychiatrist changed amitriptyline to doxepin and headaches worsened. Changed back to amitriptyline 100mg and did well for a while, but seen here by Bobbi Juarez for increased frequency of headaches in February. Her blood pressure was also elevated so the propranolol dose was increased. Initially bp was too low, passed out, but now bp OK. Takes naratriptan for individual headaches, less helpful with increase in frequency of headaches. Estimates 3 of 7 days has a headache, and feels stress is a big trigger. More vomiting with migraines than in the past, and seem more severe sometimes, less responsive to naratriptan.      Tried TPM, no help. On GBP for back. Unhappy with some of her doctors, feels this stress is detrimental. Planned trial of ZNS.  Today: tried ZNS, one dose was intolerable, did not continue. Currently averaging about 3 migraines/week, no change except more intense, seeing spots, more vomiting. Ondansetron somewhat helpful for nausea. Amerge/naratriptan seems less effective than in the past. Has been off of propranolol for a while. Has used sumatriptan pills in past, not effective, and believes she's tried maxalt in past without benefit as well.   Trying to find a new neurosurgeon. NSG had increased amitriptyline to 150mg. Tolerated well with no daytime sleepiness, and helpful for  migraines. Also has fibromyalgia and insomnia.     The following portions of the patient's history were reviewed and updated as appropriate: allergies, current medications, past family history, past medical history, past social history, past surgical history and problem list.    Allergies   Allergen Reactions   • Keflex [Cephalexin]    • Nsaids    • Tylenol With Codeine #3 [Acetaminophen-Codeine]    • Ultram [Tramadol]        Current Outpatient Prescriptions on File Prior to Visit   Medication Sig Dispense Refill   • diazepam (VALIUM) 2 MG tablet Take 2 mg by mouth 3 (Three) Times a Day As Needed for Anxiety.     • methocarbamol (ROBAXIN) 500 MG tablet Take 500 mg by mouth as needed for muscle spasms.     • naratriptan (AMERGE) 2.5 MG tablet Take 1 tablet by mouth 1 (one) time as needed for migraine for up to 1 dose. 9 tablet 11   • ondansetron (ZOFRAN) 4 MG tablet Take 1 tablet by mouth Every 8 (Eight) Hours As Needed for nausea or vomiting. 30 tablet 5   • [DISCONTINUED] amitriptyline (ELAVIL) 100 MG tablet Take 1 tablet by mouth every night. (Patient taking differently: Take 150 mg by mouth Every Night.) 30 tablet 5   • [DISCONTINUED] brompheniramine-pseudoephedrine-DM 30-2-10 MG/5ML syrup Take 10 mL by mouth 4 (Four) Times a Day As Needed for Congestion or Cough. 118 mL 0     No current facility-administered medications on file prior to visit.        Past Medical History:   Diagnosis Date   • Abscess    • Anemia     OF CHRONIC DISORDER   • Anxiety    • Birth control counseling    • Bleeding disorder    • GERD (gastroesophageal reflux disease)    • Hiatal hernia    • Infectious viral hepatitis     CHRONIC B   • Migraine headache    • Right foot pain    • Skin infection    • Sleep difficulties    • Upper respiratory infection        Past Surgical History:   Procedure Laterality Date   • CHOLECYSTECTOMY         Social History     Social History   • Marital status:      Spouse name: N/A   • Number of  "children: N/A   • Years of education: N/A     Occupational History   • Not on file.     Social History Main Topics   • Smoking status: Current Every Day Smoker   • Smokeless tobacco: Not on file   • Alcohol use No   • Drug use: Not on file   • Sexual activity: Not on file     Other Topics Concern   • Not on file     Social History Narrative       Review of Systems   Constitutional: Negative for fever and unexpected weight change.   Respiratory: Negative for cough and shortness of breath.    Cardiovascular: Negative for chest pain.   Musculoskeletal: Positive for back pain.   Heart rate: 80    Objective   Blood pressure 110/70, height 172.7 cm (68\"), weight 64.4 kg (142 lb).    Physical Exam   Constitutional: She is oriented to person, place, and time. She appears well-developed and well-nourished.   HENT:   Head: Normocephalic and atraumatic.   Eyes: EOM are normal. Pupils are equal, round, and reactive to light.   Pulmonary/Chest: Effort normal.   Musculoskeletal: Normal range of motion.   Neurological: She is oriented to person, place, and time. She has a normal Finger-Nose-Finger Test and a normal Heel to Shin Test. Gait normal.   Skin: Skin is warm and dry.   Psychiatric: Her speech is normal.   Nursing note and vitals reviewed.      Neurologic Exam     Mental Status   Oriented to person, place, and time.   Speech: speech is normal   Level of consciousness: alert  Knowledge: consistent with education.   Able to name object. Normal comprehension.     Cranial Nerves     CN II   Visual fields full to confrontation.     CN III, IV, VI   Pupils are equal, round, and reactive to light.  Extraocular motions are normal.     CN VII   Facial expression full, symmetric.     CN IX, X   CN IX normal.   CN X normal.   Palate: symmetric    CN XI   CN XI normal.     CN XII   CN XII normal.     Motor Exam   Muscle bulk: normal  Right arm pronator drift: absent  Left arm pronator drift: absent    Strength   Strength 5/5 except as " noted.     Sensory Exam   Light touch normal.     Gait, Coordination, and Reflexes     Gait  Gait: normal    Coordination   Finger to nose coordination: normal  Heel to shin coordination: normal    Tremor   Resting tremor: absent  Intention tremor: absent  Action tremor: absent      Assessment/Plan     Melissa was seen today for migraine.    Diagnoses and all orders for this visit:    Common migraine with intractable migraine    Other orders  -     amitriptyline (ELAVIL) 150 MG tablet; Take 1 tablet by mouth Every Night.  -     propranolol (INDERAL) 20 MG tablet; Start one tab twice daily, and increase as tolerated by 20mg up to 2 tabs twice daily  -     eletriptan (RELPAX) 40 MG tablet; Take 1 tablet by mouth As Needed for Migraine.  -     SUMAtriptan (IMITREX) 6 MG/0.5ML solution injection; Inject 0.5 mL under the skin 1 (One) Time for 1 dose. May repeat once after one hour, max 2 injections in 24 hours    Discussion/Summary:  Will continue elavil as well tolerated and helpful. Will add propranolol back in, discussed risk of syncope, slow titration, monitoring heart rate and for light headedness.   Given slow onset of effect of amerge/naraptriptan, will see if faster acting triptan more effective, and see if relpax covered. For migraines she wakes with, or that are unresponsive to oral med, can try sumatriptan shot. Discussed dosing, when to use each, maximum doses, potential SEs of both.  25 minutes face to face, 15 minutes in discussion as above.   Pt prefers to f/u as needed.

## 2018-10-22 PROCEDURE — 87798 DETECT AGENT NOS DNA AMP: CPT | Performed by: FAMILY MEDICINE

## 2018-10-22 PROCEDURE — 87529 HSV DNA AMP PROBE: CPT | Performed by: FAMILY MEDICINE

## 2018-10-22 PROCEDURE — 87801 DETECT AGNT MULT DNA AMPLI: CPT | Performed by: FAMILY MEDICINE

## 2018-10-22 PROCEDURE — 87661 TRICHOMONAS VAGINALIS AMPLIF: CPT | Performed by: FAMILY MEDICINE

## 2018-10-22 PROCEDURE — 87077 CULTURE AEROBIC IDENTIFY: CPT | Performed by: FAMILY MEDICINE

## 2018-10-22 PROCEDURE — 87186 SC STD MICRODIL/AGAR DIL: CPT | Performed by: FAMILY MEDICINE

## 2018-10-22 PROCEDURE — 87491 CHLMYD TRACH DNA AMP PROBE: CPT | Performed by: FAMILY MEDICINE

## 2018-10-22 PROCEDURE — 87086 URINE CULTURE/COLONY COUNT: CPT | Performed by: FAMILY MEDICINE

## 2018-10-22 PROCEDURE — 87591 N.GONORRHOEAE DNA AMP PROB: CPT | Performed by: FAMILY MEDICINE

## 2018-10-24 ENCOUNTER — TELEPHONE (OUTPATIENT)
Dept: URGENT CARE | Facility: CLINIC | Age: 38
End: 2018-10-24

## 2018-10-25 ENCOUNTER — TELEPHONE (OUTPATIENT)
Dept: URGENT CARE | Facility: CLINIC | Age: 38
End: 2018-10-25

## 2018-10-31 ENCOUNTER — TELEPHONE (OUTPATIENT)
Dept: URGENT CARE | Facility: CLINIC | Age: 38
End: 2018-10-31

## 2018-10-31 NOTE — TELEPHONE ENCOUNTER
Patient notified of NuSwab results.  Per Dr. Edmonds, advised to f/u with PCP re: treatment and monitoring instead of getting acyclovir from our office since patient states she has no lesions at this time.  May need to be retested per Dr. Edmonds.  Patient states she does not have a PCP.  Patient is driving at this time and unable to write down phone number.  Advised that she can call back when she is at a stopping point or she can call the main line at the hospital and get transferred to central scheduling for a PCP.  Phone disconnected.

## 2020-05-18 ENCOUNTER — TELEPHONE (OUTPATIENT)
Dept: PEDIATRICS | Facility: OTHER | Age: 40
End: 2020-05-18

## 2020-05-18 ENCOUNTER — OFFICE VISIT (OUTPATIENT)
Dept: FAMILY MEDICINE CLINIC | Facility: CLINIC | Age: 40
End: 2020-05-18

## 2020-05-18 VITALS
SYSTOLIC BLOOD PRESSURE: 116 MMHG | RESPIRATION RATE: 20 BRPM | TEMPERATURE: 97.8 F | OXYGEN SATURATION: 99 % | HEIGHT: 68 IN | HEART RATE: 84 BPM | DIASTOLIC BLOOD PRESSURE: 66 MMHG | WEIGHT: 157.4 LBS | BODY MASS INDEX: 23.86 KG/M2

## 2020-05-18 DIAGNOSIS — Z12.39 ENCOUNTER FOR SCREENING FOR MALIGNANT NEOPLASM OF BREAST: ICD-10-CM

## 2020-05-18 DIAGNOSIS — M62.830 SPASM OF MUSCLE OF LOWER BACK: ICD-10-CM

## 2020-05-18 DIAGNOSIS — M79.7 FIBROMYALGIA: ICD-10-CM

## 2020-05-18 DIAGNOSIS — Z12.4 ENCOUNTER FOR PAPANICOLAOU SMEAR FOR CERVICAL CANCER SCREENING: ICD-10-CM

## 2020-05-18 DIAGNOSIS — G89.29 CHRONIC BILATERAL LOW BACK PAIN WITH LEFT-SIDED SCIATICA: Primary | ICD-10-CM

## 2020-05-18 DIAGNOSIS — G47.00 INSOMNIA, UNSPECIFIED TYPE: ICD-10-CM

## 2020-05-18 DIAGNOSIS — M54.42 CHRONIC BILATERAL LOW BACK PAIN WITH LEFT-SIDED SCIATICA: Primary | ICD-10-CM

## 2020-05-18 DIAGNOSIS — F41.9 ANXIETY: ICD-10-CM

## 2020-05-18 PROBLEM — K21.9 GERD (GASTROESOPHAGEAL REFLUX DISEASE): Status: RESOLVED | Noted: 2020-05-18 | Resolved: 2020-05-18

## 2020-05-18 PROCEDURE — 99214 OFFICE O/P EST MOD 30 MIN: CPT | Performed by: FAMILY MEDICINE

## 2020-05-18 RX ORDER — HYDROXYZINE PAMOATE 50 MG/1
50 CAPSULE ORAL 3 TIMES DAILY PRN
Qty: 90 CAPSULE | Refills: 5 | Status: SHIPPED | OUTPATIENT
Start: 2020-05-18 | End: 2021-03-02 | Stop reason: SDUPTHER

## 2020-05-18 RX ORDER — CYCLOBENZAPRINE HCL 5 MG
5 TABLET ORAL 3 TIMES DAILY PRN
Qty: 90 TABLET | Refills: 5 | Status: SHIPPED | OUTPATIENT
Start: 2020-05-18 | End: 2021-03-02

## 2020-05-18 NOTE — TELEPHONE ENCOUNTER
[facility] IS REQUESTING CLARIFICATION FOR  hydrOXYzine pamoate (VISTARIL) 50 MG capsule    [facility] CALL BACK [phone]

## 2020-05-18 NOTE — PROGRESS NOTES
Melissa Hinton is a 40 y.o. female who presents today to establish care.    Chief Complaint   Patient presents with   • Establish Care   • Med Refill        Patient is here to establish care. She has not seen PCP in several years after hers left the practice. She is being treated with flexeril for chronic back pain and fibromyalgia.  She has had back pain all of her life.  She has been stable with the Flexeril for several years.  She needs to have prescription refilled today and establish care as she has been being seen in urgent care centers and they are no longer able to refill her medicine regularly.  Patient has had issues with substance abuse using heroin in the past but has been in treatment and off of all opiates 450 days.  Patient has anxiety for several years which is well controlled.  She takes vistaril for anxiety. She does not take any other medications.     LAURO-7  Over the last two weeks, how often have you been bothered by the following problems?  Feeling nervous, anxious or on edge: 1  Not being able to stop or control worryin  Worrying too much about different things: 1  Trouble Relaxin  Being so restless that it is hard to sit still: 0  Becoming easily annoyed or irritable: 0  Feeling afraid as if something awful might happen: 0  LAURO 7 Total Score: 4  If you checked any problems, how difficult have these problems made it for you to do your work, take care of things at home, or get along with other people: Somewhat difficult      Review of Systems   Constitutional: Negative for fever and unexpected weight loss.   HENT: Negative for congestion, ear pain and sore throat.    Eyes: Negative for visual disturbance.   Respiratory: Negative for cough, shortness of breath and wheezing.    Cardiovascular: Negative for chest pain and palpitations.   Gastrointestinal: Negative for abdominal pain, blood in stool, constipation, diarrhea, nausea, vomiting and GERD.   Endocrine: Negative for polydipsia and  polyuria.   Genitourinary: Negative for difficulty urinating, menstrual problem, pelvic pain, vaginal bleeding, vaginal discharge and vaginal pain.   Musculoskeletal: Positive for back pain. Negative for joint swelling.   Skin: Negative for rash and skin lesions.   Allergic/Immunologic: Negative for environmental allergies.   Neurological: Negative for seizures and syncope.   Hematological: Does not bruise/bleed easily.   Psychiatric/Behavioral: Negative for suicidal ideas and depressed mood. The patient is nervous/anxious.         PHQ-9 Depression Screening  Little interest or pleasure in doing things? 0   Feeling down, depressed, or hopeless? 0   Trouble falling or staying asleep, or sleeping too much?     Feeling tired or having little energy?     Poor appetite or overeating?     Feeling bad about yourself - or that you are a failure or have let yourself or your family down?     Trouble concentrating on things, such as reading the newspaper or watching television?     Moving or speaking so slowly that other people could have noticed? Or the opposite - being so fidgety or restless that you have been moving around a lot more than usual?     Thoughts that you would be better off dead, or of hurting yourself in some way?     PHQ-9 Total Score 0   If you checked off any problems, how difficult have these problems made it for you to do your work, take care of things at home, or get along with other people?         Past Medical History:   Diagnosis Date   • Anemia     OF CHRONIC DISORDER   • Anxiety    • GERD (gastroesophageal reflux disease)    • Hiatal hernia         Past Surgical History:   Procedure Laterality Date   • CHOLECYSTECTOMY  2011   • WISDOM TOOTH EXTRACTION  2005        Family History   Problem Relation Age of Onset   • Hypertension Mother    • Heart attack Mother    • Breast cancer Mother    • Hypertension Father    • Diabetes Father    • Breast cancer Sister    • No Known Problems Daughter    • Heart  "attack Maternal Grandmother    • Stroke Maternal Grandmother    • Lung cancer Maternal Grandfather    • Dementia Paternal Grandmother    • Diabetes Paternal Grandfather    • Dementia Paternal Grandfather         Social History     Socioeconomic History   • Marital status:      Spouse name: Not on file   • Number of children: Not on file   • Years of education: Not on file   • Highest education level: Not on file   Tobacco Use   • Smoking status: Current Every Day Smoker     Packs/day: 1.00     Years: 21.00     Pack years: 21.00   • Smokeless tobacco: Never Used   Substance and Sexual Activity   • Alcohol use: No   • Drug use: Yes     Types: Heroin, Methamphetamines     Comment: In recovery, 2019   • Sexual activity: Not Currently     Partners: Male     Birth control/protection: Condom     Comment: 1 partner in past year         No LMP recorded.    Current Outpatient Medications on File Prior to Visit   Medication Sig Dispense Refill   • [DISCONTINUED] cyclobenzaprine (FLEXERIL) 5 MG tablet Take 1 tablet by mouth 3 (Three) Times a Day As Needed for Muscle Spasms. 45 tablet 0   • [DISCONTINUED] hydrOXYzine pamoate (VISTARIL) 50 MG capsule 1 qhs for sleep 30 capsule 0   • [DISCONTINUED] metroNIDAZOLE (FLAGYL) 500 MG tablet Take 1 tablet by mouth 2 (Two) Times a Day. 14 tablet 0     No current facility-administered medications on file prior to visit.        Allergies   Allergen Reactions   • Nsaids    • Tramadol Hives     migraine   • Tylenol With Codeine #3 [Acetaminophen-Codeine]    • Keflex [Cephalexin] Other (See Comments)     Gives her a headache        Visit Vitals  /66 (BP Location: Right arm, Patient Position: Sitting, Cuff Size: Adult)   Pulse 84   Temp 97.8 °F (36.6 °C) (Temporal)   Resp 20   Ht 172.7 cm (68\")   Wt 71.4 kg (157 lb 6.4 oz)   SpO2 99%   BMI 23.93 kg/m²        Physical Exam   Constitutional: She is oriented to person, place, and time. She appears well-developed and " well-nourished. No distress.   HENT:   Head: Atraumatic.   Eyes: EOM are normal.   Neck: Normal range of motion. Neck supple.   Cardiovascular: Normal rate, regular rhythm, normal heart sounds and intact distal pulses. Exam reveals no gallop and no friction rub.   No murmur heard.  Pulmonary/Chest: Effort normal and breath sounds normal. No respiratory distress. She has no wheezes. She has no rales.   Abdominal: Soft. Bowel sounds are normal. She exhibits no distension. There is no tenderness.   Musculoskeletal: She exhibits no edema.   Neurological: She is alert and oriented to person, place, and time.   Skin: Skin is warm and dry. She is not diaphoretic.   Psychiatric: She has a normal mood and affect. Her behavior is normal.             Problems Addressed this Visit        Nervous and Auditory    Chronic back pain - Primary     Chronic back pain fibromyalgia well-controlled cyclobenzaprine.  Continue this medication.         Relevant Medications    cyclobenzaprine (FLEXERIL) 5 MG tablet    hydrOXYzine pamoate (VISTARIL) 50 MG capsule    Fibromyalgia    Relevant Medications    cyclobenzaprine (FLEXERIL) 5 MG tablet    hydrOXYzine pamoate (VISTARIL) 50 MG capsule       Other    Anxiety     Anxiety well controlled.  Continue Vistaril.           Other Visit Diagnoses     Spasm of muscle of lower back        Relevant Medications    cyclobenzaprine (FLEXERIL) 5 MG tablet    hydrOXYzine pamoate (VISTARIL) 50 MG capsule    Insomnia, unspecified type        Relevant Medications    hydrOXYzine pamoate (VISTARIL) 50 MG capsule    Encounter for Papanicolaou smear for cervical cancer screening        Relevant Orders    Mammo Screening Digital Tomosynthesis Bilateral With CAD    Encounter for screening for malignant neoplasm of breast        Relevant Orders    Ambulatory Referral to Obstetrics / Gynecology          Return in about 6 months (around 11/18/2020) for Annual.    Parts of this office note have been dictated by voice  recognition software. Grammatical and/or spelling errors may be present.    Brock Mcelroy MD   5/18/2020

## 2021-02-15 ENCOUNTER — HOSPITAL ENCOUNTER (EMERGENCY)
Facility: HOSPITAL | Age: 41
Discharge: HOME | End: 2021-02-16
Attending: EMERGENCY MEDICINE | Admitting: EMERGENCY MEDICINE

## 2021-02-15 DIAGNOSIS — R10.32 ACUTE BILATERAL LOWER ABDOMINAL PAIN: ICD-10-CM

## 2021-02-15 DIAGNOSIS — N93.9 VAGINAL BLEEDING: ICD-10-CM

## 2021-02-15 DIAGNOSIS — R10.31 ACUTE BILATERAL LOWER ABDOMINAL PAIN: ICD-10-CM

## 2021-02-15 DIAGNOSIS — O20.0 THREATENED MISCARRIAGE: Primary | ICD-10-CM

## 2021-02-15 DIAGNOSIS — Z3A.08 8 WEEKS GESTATION OF PREGNANCY: ICD-10-CM

## 2021-02-15 PROCEDURE — 99283 EMERGENCY DEPT VISIT LOW MDM: CPT

## 2021-02-15 RX ORDER — SODIUM CHLORIDE 0.9 % (FLUSH) 0.9 %
10 SYRINGE (ML) INJECTION AS NEEDED
Status: DISCONTINUED | OUTPATIENT
Start: 2021-02-15 | End: 2021-02-16 | Stop reason: HOSPADM

## 2021-02-16 VITALS
WEIGHT: 170 LBS | HEART RATE: 91 BPM | BODY MASS INDEX: 25.76 KG/M2 | DIASTOLIC BLOOD PRESSURE: 96 MMHG | RESPIRATION RATE: 16 BRPM | SYSTOLIC BLOOD PRESSURE: 133 MMHG | TEMPERATURE: 98.9 F | OXYGEN SATURATION: 98 % | HEIGHT: 68 IN

## 2021-02-16 LAB
ABO GROUP BLD: NORMAL
BASOPHILS # BLD AUTO: 0.05 10*3/MM3 (ref 0–0.2)
BASOPHILS NFR BLD AUTO: 0.9 % (ref 0–1.5)
DEPRECATED RDW RBC AUTO: 42.3 FL (ref 37–54)
EOSINOPHIL # BLD AUTO: 0.12 10*3/MM3 (ref 0–0.4)
EOSINOPHIL NFR BLD AUTO: 2.2 % (ref 0.3–6.2)
ERYTHROCYTE [DISTWIDTH] IN BLOOD BY AUTOMATED COUNT: 13.6 % (ref 12.3–15.4)
HCG INTACT+B SERPL-ACNC: NORMAL MIU/ML
HCT VFR BLD AUTO: 35.2 % (ref 34–46.6)
HGB BLD-MCNC: 11.2 G/DL (ref 12–15.9)
HOLD SPECIMEN: NORMAL
IMM GRANULOCYTES # BLD AUTO: 0.01 10*3/MM3 (ref 0–0.05)
IMM GRANULOCYTES NFR BLD AUTO: 0.2 % (ref 0–0.5)
LYMPHOCYTES # BLD AUTO: 1.56 10*3/MM3 (ref 0.7–3.1)
LYMPHOCYTES NFR BLD AUTO: 28.1 % (ref 19.6–45.3)
MCH RBC QN AUTO: 27.1 PG (ref 26.6–33)
MCHC RBC AUTO-ENTMCNC: 31.8 G/DL (ref 31.5–35.7)
MCV RBC AUTO: 85.2 FL (ref 79–97)
MONOCYTES # BLD AUTO: 0.65 10*3/MM3 (ref 0.1–0.9)
MONOCYTES NFR BLD AUTO: 11.7 % (ref 5–12)
NEUTROPHILS NFR BLD AUTO: 3.17 10*3/MM3 (ref 1.7–7)
NEUTROPHILS NFR BLD AUTO: 56.9 % (ref 42.7–76)
NRBC BLD AUTO-RTO: 0 /100 WBC (ref 0–0.2)
NUMBER OF DOSES: NORMAL
PLATELET # BLD AUTO: 254 10*3/MM3 (ref 140–450)
PMV BLD AUTO: 11.5 FL (ref 6–12)
RBC # BLD AUTO: 4.13 10*6/MM3 (ref 3.77–5.28)
RH BLD: POSITIVE
WBC # BLD AUTO: 5.56 10*3/MM3 (ref 3.4–10.8)
WHOLE BLOOD HOLD SPECIMEN: NORMAL
WHOLE BLOOD HOLD SPECIMEN: NORMAL

## 2021-02-16 PROCEDURE — 84702 CHORIONIC GONADOTROPIN TEST: CPT | Performed by: EMERGENCY MEDICINE

## 2021-02-16 PROCEDURE — 86901 BLOOD TYPING SEROLOGIC RH(D): CPT | Performed by: EMERGENCY MEDICINE

## 2021-02-16 PROCEDURE — 86900 BLOOD TYPING SEROLOGIC ABO: CPT | Performed by: EMERGENCY MEDICINE

## 2021-02-16 PROCEDURE — 85025 COMPLETE CBC W/AUTO DIFF WBC: CPT | Performed by: EMERGENCY MEDICINE

## 2021-02-16 NOTE — ED PROVIDER NOTES
EMERGENCY DEPARTMENT ENCOUNTER      Pt Name: Melissa Hinton  MRN: [id number]  YOB: 1980  Date of evaluation: 2/15/2021  Provider: Rocky Payne MD    CHIEF COMPLAINT       Chief Complaint   Patient presents with   • Vaginal Bleeding - Pregnant         HISTORY OF PRESENT ILLNESS  (Location/Symptom, Timing/Onset, Context/Setting, Quality, Duration, Modifying Factors, Severity.)   Melissa Hinton is a 40 y.o. female who presents to the emergency department with acute onset of bright red mild severity vaginal spotting without any modifying factors along with mild bilateral cramping lower abdominal pain without any modifying factors that has been ongoing for the past 6 hours.  Patient had been in another emergency department earlier tonight but states that she received no testing and so proceeded to our emergency department.  This is her sixth pregnancy.  She has had 3 elective abortions, 1 miscarriage, and one normal pregnancy.  She denies any previous history of ectopic pregnancy.  She denies any associated nausea, vomiting, fever, chills, or diarrhea.  This was an unplanned pregnancy and no fertility treatments or medications were used to conceive.      Nursing notes were reviewed.    REVIEW OF SYSTEMS    (2-9 systems for level 4, 10 or more for level 5)   ROS:  General:  No fevers, no chills, no weakness  Cardiovascular:  No chest pain, no palpitations  Respiratory:  No shortness of breath, no cough, no wheezing  Gastrointestinal: Abdominal cramping  Musculoskeletal:  No muscle pain, no joint pain  Skin:  No rash, no easy bruising  Neurologic:  No speech problems, no headache, no extremity numbness, no extremity tingling, no extremity weakness  Psychiatric:  No anxiety  Genitourinary: Vaginal bleeding    Except as noted above the remainder of the review of systems was reviewed and negative.       PAST MEDICAL HISTORY     Past Medical History:   Diagnosis Date   • Anemia     OF  CHRONIC DISORDER   • Anxiety    • GERD (gastroesophageal reflux disease)    • Hiatal hernia          SURGICAL HISTORY       Past Surgical History:   Procedure Laterality Date   • CHOLECYSTECTOMY  2011   • WISDOM TOOTH EXTRACTION  2005         CURRENT MEDICATIONS     No current facility-administered medications for this encounter.     Current Outpatient Medications:   •  hydrOXYzine pamoate (VISTARIL) 50 MG capsule, Take 1 capsule by mouth 3 (Three) Times a Day As Needed for Anxiety. 1 qhs for sleep, Disp: 90 capsule, Rfl: 5  •  cyclobenzaprine (FLEXERIL) 5 MG tablet, Take 1 tablet by mouth 3 (Three) Times a Day As Needed for Muscle Spasms., Disp: 90 tablet, Rfl: 5  •  metroNIDAZOLE (FLAGYL) 500 MG tablet, Take 1 tablet by mouth 2 (Two) Times a Day., Disp: 14 tablet, Rfl: 0    ALLERGIES     Nsaids, Tramadol, Tylenol with codeine #3 [acetaminophen-codeine], and Keflex [cephalexin]    FAMILY HISTORY       Family History   Problem Relation Age of Onset   • Hypertension Mother    • Heart attack Mother    • Breast cancer Mother    • Hypertension Father    • Diabetes Father    • Breast cancer Sister    • No Known Problems Daughter    • Heart attack Maternal Grandmother    • Stroke Maternal Grandmother    • Lung cancer Maternal Grandfather    • Dementia Paternal Grandmother    • Diabetes Paternal Grandfather    • Dementia Paternal Grandfather           SOCIAL HISTORY       Social History     Socioeconomic History   • Marital status:      Spouse name: Not on file   • Number of children: Not on file   • Years of education: Not on file   • Highest education level: Not on file   Tobacco Use   • Smoking status: Current Every Day Smoker     Packs/day: 1.00     Years: 21.00     Pack years: 21.00   • Smokeless tobacco: Never Used   Substance and Sexual Activity   • Alcohol use: No   • Drug use: Not Currently     Types: Heroin, Methamphetamines     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  HEENT: Conjunctiva normal.  Neck: Trachea midline.  Cardiac: Heart regular rate, rhythm, no murmurs, rubs, or gallops  Lungs: Lungs are clear to auscultation, there is no wheezing, rhonchi, or rales. There is no use of accessory muscles.  Chest wall: There is no tenderness to palpation over the chest wall or over ribs  Abdomen: Abdomen is soft, nontender, nondistended. There are no firm or pulsatile masses, no rebound rigidity or guarding.   Musculoskeletal: No deformity.  Neuro: Alert and oriented x 4.  Dermatology: Skin is warm and dry  Psych: Mentation is grossly normal, cognition is grossly normal. Affect is appropriate.        DIAGNOSTIC RESULTS     ED BEDSIDE ULTRASOUND:   Performed by ED Physician -living intrauterine pregnancy with reassuring fetal heart tones and no evidence of pelvic free fluid or adnexal mass    LABS:    I have reviewed and interpreted all of the currently available lab results from this visit (if applicable):  Results for orders placed or performed during the hospital encounter of 02/15/21   hCG, Quantitative, Pregnancy    Specimen: Blood   Result Value Ref Range    HCG Quantitative 35,633.00 mIU/mL   Gray Top - Ice   Result Value Ref Range    Extra Tube Hold for add-ons.    CBC Auto Differential    Specimen: Blood   Result Value Ref Range    WBC 5.56 3.40 - 10.80 10*3/mm3    RBC 4.13 3.77 - 5.28 10*6/mm3    Hemoglobin 11.2 (L) 12.0 - 15.9 g/dL    Hematocrit 35.2 34.0 - 46.6 %    MCV 85.2 79.0 - 97.0 fL " "   MCH 27.1 26.6 - 33.0 pg    MCHC 31.8 31.5 - 35.7 g/dL    RDW 13.6 12.3 - 15.4 %    RDW-SD 42.3 37.0 - 54.0 fl    MPV 11.5 6.0 - 12.0 fL    Platelets 254 140 - 450 10*3/mm3    Neutrophil % 56.9 42.7 - 76.0 %    Lymphocyte % 28.1 19.6 - 45.3 %    Monocyte % 11.7 5.0 - 12.0 %    Eosinophil % 2.2 0.3 - 6.2 %    Basophil % 0.9 0.0 - 1.5 %    Immature Grans % 0.2 0.0 - 0.5 %    Neutrophils, Absolute 3.17 1.70 - 7.00 10*3/mm3    Lymphocytes, Absolute 1.56 0.70 - 3.10 10*3/mm3    Monocytes, Absolute 0.65 0.10 - 0.90 10*3/mm3    Eosinophils, Absolute 0.12 0.00 - 0.40 10*3/mm3    Basophils, Absolute 0.05 0.00 - 0.20 10*3/mm3    Immature Grans, Absolute 0.01 0.00 - 0.05 10*3/mm3    nRBC 0.0 0.0 - 0.2 /100 WBC    RhIg Evaluation    Specimen: Blood   Result Value Ref Range    ABO Type O     RH type Positive    Doses of Rh Immune Globulin    Specimen: Blood   Result Value Ref Range    Number of Doses       RhIg is not indicated due to the patient's Rh status   Light Blue Top   Result Value Ref Range    Extra Tube hold for add-on    Green Top (Gel)   Result Value Ref Range    Extra Tube Hold for add-ons.    Lavender Top   Result Value Ref Range    Extra Tube hold for add-on    Gold Top - SST   Result Value Ref Range    Extra Tube Hold for add-ons.         All other labs were within normal range or not returned as of this dictation.      EMERGENCY DEPARTMENT COURSE and DIFFERENTIAL DIAGNOSIS/MDM:   Vitals:    Vitals:    02/15/21 2335 21 0029   BP: 153/83 133/96   BP Location: Left arm Right arm   Patient Position: Sitting    Pulse: 92 91   Resp: 17 16   Temp: 98.9 °F (37.2 °C)    TempSrc: Oral    SpO2: 100% 98%   Weight: 77.1 kg (170 lb)    Height: 172.7 cm (68\")        ED Course as of  0550   Tue 2021   0040 Bedside ultrasound reveals intrauterine pregnancy with estimated fetal heart rate of approximately 140 bpm.  There is no evidence of adnexal mass or pelvic free fluid.    [NS]   0147 Patient " sabrina well-appearing and stable.  Patient with bedside ultrasound demonstrating living intrauterine pregnancy with reassuring fetal heart tones.  Differential diagnosis includes subchorionic hemorrhage versus implantation bleeding versus threatened miscarriage.  There is no sonographic evidence of ectopic pregnancy and no risk factors for heterotopic pregnancy.  Patient's hemoglobin is normal.  Her abdominal exam is benign and she has bilateral lower abdominal cramping without any concern for surgical process such as torsion or appendicitis.    [NS]      ED Course User Index  [NS] Rocky Payne MD       Patient well-appearing with no evidence of emergent pathology as noted above during the course of her emergency department stay.  Appropriate for discharge home and close outpatient follow-up with OB/GYN.    I had a discussion with the patient/family regarding diagnosis, diagnostic results, treatment plan, and medications.  The patient/family indicated understanding of these instructions.  I spent adequate time at the bedside preceding discharge necessary to personally discuss the aftercare instructions, giving patient education, providing explanations of the results of our evaluations/findings, and my decision making to assure that the patient/family understand the plan of care.  Time was allotted to answer questions at that time and throughout the ED course.  Emphasis was placed on timely follow-up after discharge.  I also discussed the potential for the development of an acute emergent condition requiring further evaluation, admission, or even surgical intervention. I discussed that we found nothing during the visit today indicating the need for further workup, admission, or the presence of an unstable medical condition.  I encouraged the patient to return to the emergency department immediately for ANY concerns, worsening, new complaints, or if symptoms persist and unable to seek follow-up in a timely  fashion.  The patient/family expressed understanding and agreement with this plan.  The patient will follow-up with their PCP in 1-2 days for reevaluation.         FINAL IMPRESSION      1. Threatened miscarriage    2. Vaginal bleeding    3. 8 weeks gestation of pregnancy    4. Acute bilateral lower abdominal pain          DISPOSITION/PLAN     ED Disposition     ED Disposition Condition Comment    Discharge Stable           PATIENT REFERRED TO:  Brock Mcelroy MD  [address] [facility]  [address]  [place] [id number]  [phone]    Schedule an appointment as soon as possible for a visit in 2 days      [facility] Emergency Department  [address] [id number] [phone]    If symptoms worsen    Sana Gleason MD  [address] [facility] [address]  [place] [address]  [phone]    Schedule an appointment as soon as possible for a visit in 2 days        DISCHARGE MEDICATIONS:     Medication List      CONTINUE taking these medications    cyclobenzaprine 5 MG tablet  Commonly known as: FLEXERIL  Take 1 tablet by mouth 3 (Three) Times a Day As Needed for Muscle Spasms.     hydrOXYzine pamoate 50 MG capsule  Commonly known as: VISTARIL  Take 1 capsule by mouth 3 (Three) Times a Day As Needed for Anxiety. 1 qhs for sleep     metroNIDAZOLE 500 MG tablet  Commonly known as: FLAGYL  Take 1 tablet by mouth 2 (Two) Times a Day.                Comment: Please note this report has been produced using speech recognition software.      Rocky Payne MD  Attending Emergency Physician               Rocky Payne MD  02/16/21 7996

## 2021-02-16 NOTE — DISCHARGE INSTRUCTIONS
Please return to the emergency department immediately with any worsening bleeding or pain.  Please follow-up with OB/GYN as soon as possible.

## 2021-02-26 ENCOUNTER — TELEPHONE (OUTPATIENT)
Dept: OBSTETRICS AND GYNECOLOGY | Facility: CLINIC | Age: 41
End: 2021-02-26

## 2021-02-26 NOTE — TELEPHONE ENCOUNTER
Patient is newly pregnant, states she has been spotting on and off as well as cramping, recently was seeing dr. Dela Cruz but would like to switch to see Dr. Ladd, she is high risk due to be 40 and would like to discuss LMP 02/29/21

## 2021-02-26 NOTE — TELEPHONE ENCOUNTER
Pt. Had an US 2/15 with a single viable IUP. She was seen in the ER that day for vaginal bleeding, her BT was O+. Scheduled for NOB on Tuesday with [facility].

## 2021-03-02 ENCOUNTER — INITIAL PRENATAL (OUTPATIENT)
Dept: OBSTETRICS AND GYNECOLOGY | Facility: CLINIC | Age: 41
End: 2021-03-02

## 2021-03-02 VITALS — WEIGHT: 188 LBS | DIASTOLIC BLOOD PRESSURE: 84 MMHG | SYSTOLIC BLOOD PRESSURE: 124 MMHG | BODY MASS INDEX: 28.59 KG/M2

## 2021-03-02 DIAGNOSIS — G89.29 CHRONIC BILATERAL LOW BACK PAIN WITH LEFT-SIDED SCIATICA: ICD-10-CM

## 2021-03-02 DIAGNOSIS — M79.7 FIBROMYALGIA: ICD-10-CM

## 2021-03-02 DIAGNOSIS — Z3A.09 9 WEEKS GESTATION OF PREGNANCY: Primary | ICD-10-CM

## 2021-03-02 DIAGNOSIS — G47.00 INSOMNIA, UNSPECIFIED TYPE: ICD-10-CM

## 2021-03-02 DIAGNOSIS — O09.521 MULTIGRAVIDA OF ADVANCED MATERNAL AGE IN FIRST TRIMESTER: ICD-10-CM

## 2021-03-02 DIAGNOSIS — M54.42 CHRONIC BILATERAL LOW BACK PAIN WITH LEFT-SIDED SCIATICA: ICD-10-CM

## 2021-03-02 DIAGNOSIS — Z72.0 TOBACCO ABUSE: ICD-10-CM

## 2021-03-02 DIAGNOSIS — F41.9 ANXIETY: ICD-10-CM

## 2021-03-02 DIAGNOSIS — M62.830 SPASM OF MUSCLE OF LOWER BACK: ICD-10-CM

## 2021-03-02 PROCEDURE — 99204 OFFICE O/P NEW MOD 45 MIN: CPT | Performed by: OBSTETRICS & GYNECOLOGY

## 2021-03-02 RX ORDER — ASCORBIC ACID, CHOLECALCIFEROL, .ALPHA.-TOCOPHEROL, DL-, FOLIC ACID, PYRIDOXINE HYDROCHLORIDE, CYANOCOBALAMIN, CALCIUM FORMATE, FERROUS ASPARTO GLYCINATE, MAGNESIUM OXIDE AND DOCONEXENT 90; 400; 40; 1; 26; 25; 155; 18; 50; 300 MG/1; [IU]/1; [IU]/1; MG/1; MG/1; UG/1; MG/1; MG/1; MG/1; MG/1
1 CAPSULE, GELATIN COATED ORAL DAILY
Qty: 90 CAPSULE | Refills: 3 | Status: SHIPPED | OUTPATIENT
Start: 2021-03-02 | End: 2021-06-04

## 2021-03-02 RX ORDER — HYDROXYZINE PAMOATE 50 MG/1
50 CAPSULE ORAL 3 TIMES DAILY PRN
Qty: 90 CAPSULE | Refills: 5 | Status: SHIPPED | OUTPATIENT
Start: 2021-03-02 | End: 2021-08-29 | Stop reason: HOSPADM

## 2021-03-02 NOTE — PROGRESS NOTES
Initial ob visit     CC- Here for care of pregnancy          Melissa Hinton is a 41 y.o. female, , who presents for her first obstetrical visit.  Her last LMP was Patient's last menstrual period was 2020 (exact date). Patient mentions vaginal spotting that has occurred x2 weeks with cramping. Has noticed vaginal spotting again last week but none since. Patient was recently treated for UTI, still experiencing dysuria. Patient would like to have her iron levels checked in regard to increased fatigue.     Patient denies loss of fluids, nausea, vomiting, shelli raias, contractions, headaches, vision changes, constipation, or heartburn.    OB History    Para Term  AB Living   6 1 1 0 4 1   SAB TAB Ectopic Molar Multiple Live Births   1 3 0 0 0 1      # Outcome Date GA Lbr Cesar/2nd Weight Sex Delivery Anes PTL Lv   6 Current            5 TAB 19           4 SAB 11           3 TAB 09           2 Term 07 38w0d  3204 g (7 lb 1 oz) F Vag-Spont EPI N MORGAN   1 TAB 03               Initial positive test date : 2021, UPT          Prior obstetric issues, potential pregnancy concerns: AMA, Blood clotting disorder  Family history of genetic issues (includes FOB): None  Prior infections concerning in pregnancy (Rash, fever in last 2 weeks): None  Varicella Hx -Yes, both vaccine and chickenpox as a child  Prior testing for Cystic Fibrosis Carrier or Sickle Cell Trait- None  Prepregnancy BMI - Body mass index is 28.59 kg/m².  History of STD: yes HSV and Hepatitis C (Says she has been tested for HSV in the past, was positive once but afterwards was clear)     Additional Pertinent History   Last Pap : , Negative per patient  Last Completed Pap Smear       Status Date      PAP SMEAR Done 2015         History of abnormal Pap smear: no  Family history of uterine, colon, breast, or ovarian cancer: yes - Mother, Sister - Breast Cancer  Performs monthly Self-Breast  Exam: no  Exercises Regularly: no  Feelings of Anxiety or Depression: no  Tobacco Usage?: Yes Melissa Hinton  reports that she has been smoking. She has a 21.00 pack-year smoking history. She has never used smokeless tobacco.. I have educated her on the risk of diseases from using tobacco products such as cancer, COPD, heart disease, reproductive problems and low birth weight.     I advised her to quit and she is not willing to quit.  States she has been in rehab and can't give up everything.        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  Past Medical History:   Diagnosis Date   • Anemia    • Anxiety    • Clotting disorder    • GERD (gastroesophageal reflux disease)    • Hiatal hernia        Current Outpatient Medications:   •  hydrOXYzine pamoate (VISTARIL) 50 MG capsule, Take 1 capsule by mouth 3 (Three) Times a Day As Needed for Anxiety. 1 qhs for sleep, Disp: 90 capsule, Rfl: 5  •  Prenat-FeAsp-Meth-FA-DHA w/o A (Prenate DHA) 18-0.6-0.4-300 MG capsule, Take 1 capsule by mouth Daily., Disp: 90 capsule, Rfl: 3    The additional following portions of the patient's history were reviewed and updated as appropriate: allergies, current medications, past family history, past medical history, past social history, past surgical history and problem list.    Review of Systems   Review of Systems  Current obstetric complaints : Vaginal Spotting and fatigue  All systems reviewed and otherwise normal.    I have reviewed and agree with the HPI, ROS, and historical information as entered above. Alka Ladd MD    /84   Wt 85.3 kg (188 lb)   LMP 12/29/2020 (Exact Date)   BMI 28.59 kg/m²     Physical Exam  General Appearance:    Alert, cooperative, in no acute distress,    Head:     Normocephalic, without obvious abnormality, atraumatic   Neck:   No adenopathy, supple, trachea midline, no thyromegaly   Back:     No kyphosis present, no scoliosis present,                                   Abdomen:     Normal bowel sounds, no masses, no organomegaly, soft        non-tender, non-distended, no guarding, no rebound                 tenderness       Extremities:   Moves all extremities well, no edema, no cyanosis, no         redness   Pulses:   Pulses palpable and equal bilaterally   Skin:   No bleeding, bruising or rash   Lymph nodes:   No palpable adenopathy   Neurologic:   Sensation intact, A&O times 3      Physical Exam has been reviewed and confirmed by Alka Ladd MD    Objective     Imaging   Pelvic ultrasound images independantly reviewed - see report    Assessment/Plan   ASSESSMENT  1. 41 y.o. year old  at 9wk  2. Supervision of high risk pregnancy  3. H/O prior narcotics abuse - in remission  4. Tobacco abuse  5. Insomnia  6. Spotting - Rh positive  7. AMA    PLAN  1. The problem list for pregnancy was initiated today  2. Tests ordered today:  Orders Placed This Encounter   Procedures   • Chlamydia trachomatis, Neisseria gonorrhoeae, PCR w/ confirmation - Urine, Urine, Clean Catch     Order Specific Question:   [facility] Has the patient fasted?     Answer:   No   • Urine Culture - Urine, Urine, Clean Catch     Order Specific Question:   [facility] Has the patient fasted?     Answer:   No   • HIV-1 / O / 2 Ag / Antibody 4th Generation     Order Specific Question:   [facility] Has the patient fasted?     Answer:   No   • Obstetric Panel     Order Specific Question:   [facility] Has the patient fasted?     Answer:   No   • Urine Drug Screen - Urine, Clean Catch     Order Specific Question:   [facility] Has the patient fasted?     Answer:   No   • [id number] PLUS Core - Blood,     Order Specific Question:   [facility] Date of last menstrual period or estimated date of delivery (corresponding to  calculation method):     Answer:   10/5/2021     Order Specific Question:   [facility] Gestational age calculation method:     Answer:   RA,EDC     Order Specific Question:   [facility] Is egg from donor?     Answer:   No     Order Specific Question:   [facility] Is patient insulin-dependent     Answer:   No   • Microscopic Examination -     Order Specific Question:   [facility] Has the patient fasted?     Answer:   No   • Urinalysis With Microscopic - Urine, Clean Catch     Order Specific Question:   [facility] Has the patient fasted?     Answer:   No     3. Testing for GC / Chlamydia was done today  4. Genetic testing reviewed: desires [id number] testing  5. Information reviewed: exercise in pregnancy, nutrition in pregnancy, weight gain in pregnancy, work and travel restrictions during pregnancy, list of OTC medications acceptable in pregnancy and call coverage groups smoking in pregnancy  6. Discussed new OB precautions for toxoplasmosis, dairy, heavy metals and diet/exercise.  7. A great deal of time was spent discussing insomnia and treatment for this.  She is well-connected with regard to h/o IV drug abuse.    Follow up: 4 week(s)       This note was electronically signed.    Alka Ladd MD  March 23, 2021

## 2021-03-05 LAB
ABO GROUP BLD: ABNORMAL
AMPHETAMINES UR QL SCN: NEGATIVE NG/ML
APPEARANCE UR: CLEAR
BACTERIA #/AREA URNS HPF: NORMAL /[HPF]
BACTERIA UR CULT: NO GROWTH
BACTERIA UR CULT: NORMAL
BARBITURATES UR QL SCN: NEGATIVE NG/ML
BASOPHILS # BLD AUTO: 0.1 X10E3/UL (ref 0–0.2)
BASOPHILS NFR BLD AUTO: 1 %
BENZODIAZ UR QL SCN: NEGATIVE NG/ML
BILIRUB UR QL STRIP: NEGATIVE
BLD GP AB SCN SERPL QL: NEGATIVE
BZE UR QL SCN: NEGATIVE NG/ML
C TRACH RRNA SPEC QL NAA+PROBE: NEGATIVE
CANNABINOIDS UR QL SCN: NEGATIVE NG/ML
COLOR UR: YELLOW
CREAT UR-MCNC: 116.8 MG/DL (ref 20–300)
EOSINOPHIL # BLD AUTO: 0.1 X10E3/UL (ref 0–0.4)
EOSINOPHIL NFR BLD AUTO: 2 %
EPI CELLS #/AREA URNS HPF: NORMAL /HPF (ref 0–10)
ERYTHROCYTE [DISTWIDTH] IN BLOOD BY AUTOMATED COUNT: 14 % (ref 11.7–15.4)
GLUCOSE UR QL: NEGATIVE
HBV SURFACE AG SERPL QL IA: NEGATIVE
HCT VFR BLD AUTO: 32.8 % (ref 34–46.6)
HCV AB S/CO SERPL IA: >11 S/CO RATIO (ref 0–0.9)
HGB BLD-MCNC: 11.2 G/DL (ref 11.1–15.9)
HGB UR QL STRIP: NEGATIVE
HIV 1+2 AB+HIV1 P24 AG SERPL QL IA: NON REACTIVE
IMM GRANULOCYTES # BLD AUTO: 0 X10E3/UL (ref 0–0.1)
IMM GRANULOCYTES NFR BLD AUTO: 0 %
KETONES UR QL STRIP: NEGATIVE
LABORATORY COMMENT REPORT: NORMAL
LEUKOCYTE ESTERASE UR QL STRIP: NEGATIVE
LYMPHOCYTES # BLD AUTO: 1.1 X10E3/UL (ref 0.7–3.1)
LYMPHOCYTES NFR BLD AUTO: 22 %
MCH RBC QN AUTO: 28.6 PG (ref 26.6–33)
MCHC RBC AUTO-ENTMCNC: 34.1 G/DL (ref 31.5–35.7)
MCV RBC AUTO: 84 FL (ref 79–97)
METHADONE UR QL SCN: NEGATIVE NG/ML
MICRO URNS: NORMAL
MICRO URNS: NORMAL
MONOCYTES # BLD AUTO: 0.5 X10E3/UL (ref 0.1–0.9)
MONOCYTES NFR BLD AUTO: 10 %
MUCOUS THREADS URNS QL MICRO: PRESENT
N GONORRHOEA RRNA SPEC QL NAA+PROBE: NEGATIVE
NEUTROPHILS # BLD AUTO: 3.2 X10E3/UL (ref 1.4–7)
NEUTROPHILS NFR BLD AUTO: 65 %
NITRITE UR QL STRIP: NEGATIVE
OPIATES UR QL SCN: NEGATIVE NG/ML
OXYCODONE+OXYMORPHONE UR QL SCN: NEGATIVE NG/ML
PCP UR QL: NEGATIVE NG/ML
PH UR STRIP: 6 [PH] (ref 5–7.5)
PH UR: 6 [PH] (ref 4.5–8.9)
PLATELET # BLD AUTO: 259 X10E3/UL (ref 150–450)
PROPOXYPH UR QL SCN: NEGATIVE NG/ML
PROT UR QL STRIP: NEGATIVE
RBC # BLD AUTO: 3.92 X10E6/UL (ref 3.77–5.28)
RBC #/AREA URNS HPF: NORMAL /HPF (ref 0–2)
RH BLD: POSITIVE
RPR SER QL: NON REACTIVE
RUBV IGG SERPL IA-ACNC: <0.9 INDEX
SP GR UR: 1.02 (ref 1–1.03)
UROBILINOGEN UR STRIP-MCNC: 0.2 MG/DL (ref 0.2–1)
WBC # BLD AUTO: 4.9 X10E3/UL (ref 3.4–10.8)
WBC #/AREA URNS HPF: NORMAL /HPF (ref 0–5)

## 2021-03-08 LAB
CFDNA.FET/CFDNA.TOTAL SFR FETUS: NORMAL %
CITATION REF LAB TEST: NORMAL
FET 13+18+21+X+Y ANEUP PLAS.CFDNA: NEGATIVE
FET CHR 21 TS PLAS.CFDNA QL: NEGATIVE
FET SEX PLAS.CFDNA DOSAGE CFDNA: NORMAL
FET TS 13 RISK PLAS.CFDNA QL: NEGATIVE
FET TS 18 RISK WBC.DNA+CFDNA QL: NEGATIVE
GA EST FROM CONCEPTION DATE: NORMAL D
GESTATIONAL AGE > 9:: YES
LAB DIRECTOR NAME PROVIDER: NORMAL
LAB DIRECTOR NAME PROVIDER: NORMAL
LABORATORY COMMENT REPORT: NORMAL
LIMITATIONS OF THE TEST: NORMAL
NEGATIVE PREDICTIVE VALUE: NORMAL
NOTE: NORMAL
PERFORMANCE CHARACTERISTICS: NORMAL
POSITIVE PREDICTIVE VALUE: NORMAL
REF LAB TEST METHOD: NORMAL
TEST PERFORMANCE INFO SPEC: NORMAL

## 2021-03-23 ENCOUNTER — ROUTINE PRENATAL (OUTPATIENT)
Dept: OBSTETRICS AND GYNECOLOGY | Facility: CLINIC | Age: 41
End: 2021-03-23

## 2021-03-23 ENCOUNTER — TELEPHONE (OUTPATIENT)
Dept: OBSTETRICS AND GYNECOLOGY | Facility: CLINIC | Age: 41
End: 2021-03-23

## 2021-03-23 VITALS — DIASTOLIC BLOOD PRESSURE: 84 MMHG | WEIGHT: 179.3 LBS | BODY MASS INDEX: 27.26 KG/M2 | SYSTOLIC BLOOD PRESSURE: 126 MMHG

## 2021-03-23 DIAGNOSIS — B18.2 CHRONIC HEPATITIS C COMPLICATING PREGNANCY, ANTEPARTUM (HCC): ICD-10-CM

## 2021-03-23 DIAGNOSIS — O98.419 CHRONIC HEPATITIS C COMPLICATING PREGNANCY, ANTEPARTUM (HCC): ICD-10-CM

## 2021-03-23 DIAGNOSIS — O09.521 MULTIGRAVIDA OF ADVANCED MATERNAL AGE IN FIRST TRIMESTER: ICD-10-CM

## 2021-03-23 DIAGNOSIS — Z3A.12 12 WEEKS GESTATION OF PREGNANCY: Primary | ICD-10-CM

## 2021-03-23 DIAGNOSIS — Z86.2 H/O BLEEDING DISORDER: ICD-10-CM

## 2021-03-23 DIAGNOSIS — Z72.0 TOBACCO ABUSE: ICD-10-CM

## 2021-03-23 PROBLEM — G47.00 INSOMNIA: Status: ACTIVE | Noted: 2021-03-23

## 2021-03-23 PROBLEM — Z34.90 PREGNANCY: Status: ACTIVE | Noted: 2021-03-23

## 2021-03-23 LAB
EXPIRATION DATE: 0
GLUCOSE UR STRIP-MCNC: NEGATIVE MG/DL
Lab: 0
PROT UR STRIP-MCNC: NEGATIVE MG/DL

## 2021-03-23 PROCEDURE — 99214 OFFICE O/P EST MOD 30 MIN: CPT | Performed by: OBSTETRICS & GYNECOLOGY

## 2021-03-23 NOTE — TELEPHONE ENCOUNTER
Spoke with PT via phone to let her know that Dr. Ladd is recommending pap smear in 2 weeks. She says she will call back once she looks at her work schedule to schedule for pap smear.

## 2021-03-23 NOTE — PROGRESS NOTES
"OB FOLLOW UP    Melissa Hinton is a 41 y.o.  12w0d patient being seen today for her obstetrical follow up visit. Patient reports bleeding, fatigue, heartburn, nausea, no contractions, no cramping and no leaking. Patient asked to review [id number] testing with Gender at today's visit.     Her prenatal care is complicated by (and status) :    Patient Active Problem List   Diagnosis   • Anemia   • Anxiety   • Chronic back pain   • Common migraine with intractable migraine   • Fibromyalgia   • Difficulty sleeping   • Chronic hepatitis B (CMS/HCC)   • H/O bleeding disorder   • Hiatal hernia   • Tobacco abuse   • Pregnancy   • Multigravida of advanced maternal age in first trimester       ROS -   Patient Reports : intermittent vaginal bleeding (Spotting), fatigue, heartburn, nausea  Patient Denies: Loss of Fluid, Vision Changes, Headaches and Cramping/Contractions   Fetal Movement : Absent, too early      /84   Wt 81.3 kg (179 lb 4.8 oz)   LMP 2020 (Exact Date)   BMI 27.26 kg/m²     Ultrasound Today: no    EXAM:  Vitals: See prenatal flowsheet   Abdomen: See prenatal flowsheet   Urine glucose/protein: See prenatal flowsheet   Pelvic: See prenatal flowsheet     Assessment    1. Pregnancy at 12w0d  2. Fetal status reassuring     Problem List Items Addressed This Visit     H/O bleeding disorder    Tobacco abuse    Pregnancy - Primary    Relevant Orders    POC Glucose, Urine, Qualitative, Dipstick (Completed)    POC Protein, Urine, Qualitative, Dipstick (Completed)    Von Willebrand Screen    Comprehensive Metabolic Panel    CBC (No Diff)    Protime-INR    APTT    Multigravida of advanced maternal age in first trimester          Plan    1. H/o bleeding d/o - patient told at age 8 that she was very anemic and so a workup was done.  States they decided she \"just had internal bleeding\" and she was told to take iron.  When I mentioned Von Willebrandt's, she said, \"that's it!\"  -  CBC, PTT/PT today " with VW screen.  2. H/o Hep C - states no trouble with her liver because of it.  Will check CMP to be sure.  3. AMA - reviewed normal cfDNA testing.  4. F/u 4wk.    Alka Ladd MD  03/23/2021

## 2021-04-12 LAB
ALBUMIN SERPL-MCNC: 4.1 G/DL (ref 3.5–5.2)
ALBUMIN/GLOB SERPL: 1.4 G/DL
ALP SERPL-CCNC: 48 U/L (ref 39–117)
ALT SERPL-CCNC: 11 U/L (ref 1–33)
APTT PPP: 26.7 SEC
APTT PPP: 32.4 SECONDS (ref 24–37)
AST SERPL-CCNC: 12 U/L (ref 1–32)
BILIRUB SERPL-MCNC: 0.2 MG/DL (ref 0–1.2)
BUN SERPL-MCNC: 7 MG/DL (ref 6–20)
BUN/CREAT SERPL: 12.1 (ref 7–25)
CALCIUM SERPL-MCNC: 8.7 MG/DL (ref 8.6–10.5)
CHLORIDE SERPL-SCNC: 108 MMOL/L (ref 98–107)
CO2 SERPL-SCNC: 20.6 MMOL/L (ref 22–29)
CREAT SERPL-MCNC: 0.58 MG/DL (ref 0.57–1)
ERYTHROCYTE [DISTWIDTH] IN BLOOD BY AUTOMATED COUNT: 15 % (ref 12.3–15.4)
FACT VIII ACT/NOR PPP: 83 %
GLOBULIN SER CALC-MCNC: 2.9 GM/DL
GLUCOSE SERPL-MCNC: 76 MG/DL (ref 65–99)
HCT VFR BLD AUTO: 36.5 % (ref 34–46.6)
HGB BLD-MCNC: 12.3 G/DL (ref 12–15.9)
INR PPP: 0.98 (ref 0.85–1.16)
MCH RBC QN AUTO: 28.4 PG (ref 26.6–33)
MCHC RBC AUTO-ENTMCNC: 33.7 G/DL (ref 31.5–35.7)
MCV RBC AUTO: 84.3 FL (ref 79–97)
PLATELET # BLD AUTO: 252 10*3/MM3 (ref 140–450)
POTASSIUM SERPL-SCNC: 4.1 MMOL/L (ref 3.5–5.2)
PROT SERPL-MCNC: 7 G/DL (ref 6–8.5)
PROTHROMBIN TIME: 12.7 SECONDS (ref 11.5–14)
RBC # BLD AUTO: 4.33 10*6/MM3 (ref 3.77–5.28)
SODIUM SERPL-SCNC: 139 MMOL/L (ref 136–145)
VWF AG ACT/NOR PPP IA: 74 %
VWF:RCO ACT/NOR PPP PL AGG: 61 %
WBC # BLD AUTO: 5.09 10*3/MM3 (ref 3.4–10.8)

## 2021-04-20 ENCOUNTER — ROUTINE PRENATAL (OUTPATIENT)
Dept: OBSTETRICS AND GYNECOLOGY | Facility: CLINIC | Age: 41
End: 2021-04-20

## 2021-04-20 VITALS — DIASTOLIC BLOOD PRESSURE: 80 MMHG | WEIGHT: 183 LBS | SYSTOLIC BLOOD PRESSURE: 140 MMHG | BODY MASS INDEX: 27.83 KG/M2

## 2021-04-20 DIAGNOSIS — B18.2 CHRONIC HEPATITIS C COMPLICATING PREGNANCY, ANTEPARTUM (HCC): ICD-10-CM

## 2021-04-20 DIAGNOSIS — Z72.0 TOBACCO ABUSE: ICD-10-CM

## 2021-04-20 DIAGNOSIS — O09.521 MULTIGRAVIDA OF ADVANCED MATERNAL AGE IN FIRST TRIMESTER: ICD-10-CM

## 2021-04-20 DIAGNOSIS — O98.419 CHRONIC HEPATITIS C COMPLICATING PREGNANCY, ANTEPARTUM (HCC): ICD-10-CM

## 2021-04-20 DIAGNOSIS — Z3A.16 16 WEEKS GESTATION OF PREGNANCY: Primary | ICD-10-CM

## 2021-04-20 LAB
GLUCOSE UR STRIP-MCNC: NEGATIVE MG/DL
PROT UR STRIP-MCNC: NEGATIVE MG/DL

## 2021-04-20 PROCEDURE — 99213 OFFICE O/P EST LOW 20 MIN: CPT | Performed by: OBSTETRICS & GYNECOLOGY

## 2021-04-20 RX ORDER — METOCLOPRAMIDE 10 MG/1
10 TABLET ORAL 3 TIMES DAILY PRN
Qty: 30 TABLET | Refills: 1 | Status: SHIPPED | OUTPATIENT
Start: 2021-04-20 | End: 2021-06-04

## 2021-04-20 NOTE — PROGRESS NOTES
OB FOLLOW UP    Melissa Hinton is a 41 y.o.  16w0d patient being seen today for her obstetrical follow up visit. Patient reports pain all over. Pt undecided about msAFP. C/o pain all over and tiredness.    Her prenatal care is complicated by (and status) :    Patient Active Problem List   Diagnosis   • Anemia   • Anxiety   • Chronic back pain   • Common migraine with intractable migraine   • Fibromyalgia   • Difficulty sleeping   • Chronic hepatitis B (CMS/HCC)   • H/O bleeding disorder   • Hiatal hernia   • Tobacco abuse   • Pregnancy   • Multigravida of advanced maternal age in first trimester   • Chronic hepatitis C complicating pregnancy, antepartum (CMS/HCC)   • Insomnia       ROS -   Patient Reports: Pain all over  Patient Denies: Loss of Fluid, Vaginal Spotting, Vision Changes, Headaches and Cramping/Contractions   Fetal Movement : not at this gestation      /80   Wt 83 kg (183 lb)   LMP 2020 (Exact Date)   BMI 27.83 kg/m²     Ultrasound Today: no    EXAM:  Vitals: See prenatal flowsheet   Abdomen: See prenatal flowsheet   Urine glucose/protein: See prenatal flowsheet   Pelvic: See prenatal flowsheet     Assessment    1. Pregnancy at 16w0d  2. Fetal status reassuring     Problem List Items Addressed This Visit     Tobacco abuse    Pregnancy - Primary    Relevant Orders    POC Urinalysis Dipstick, Automated (Completed)    US Ob 14 + Weeks Single or First Gestation    Multigravida of advanced maternal age in first trimester    Chronic hepatitis C complicating pregnancy, antepartum (CMS/HCC)          Plan    1. Fetal movement/ Labor Precautions  2. Declines AFP today  3. Has not been able to quit smoking yet.  She needs to pick her battles right now.  4. Follow Up: Return in about 4 weeks (around 2021).    Alka Ladd MD  2021

## 2021-05-18 ENCOUNTER — ROUTINE PRENATAL (OUTPATIENT)
Dept: OBSTETRICS AND GYNECOLOGY | Facility: CLINIC | Age: 41
End: 2021-05-18

## 2021-05-18 VITALS — WEIGHT: 189 LBS | DIASTOLIC BLOOD PRESSURE: 70 MMHG | BODY MASS INDEX: 28.74 KG/M2 | SYSTOLIC BLOOD PRESSURE: 116 MMHG

## 2021-05-18 DIAGNOSIS — Z34.82 PRENATAL CARE, SUBSEQUENT PREGNANCY, SECOND TRIMESTER: Primary | ICD-10-CM

## 2021-05-18 DIAGNOSIS — O09.522 MULTIGRAVIDA OF ADVANCED MATERNAL AGE IN SECOND TRIMESTER: ICD-10-CM

## 2021-05-18 DIAGNOSIS — F19.11 SUBSTANCE ABUSE IN REMISSION (HCC): ICD-10-CM

## 2021-05-18 DIAGNOSIS — Z72.0 TOBACCO ABUSE: ICD-10-CM

## 2021-05-18 DIAGNOSIS — B18.2 CHRONIC HEPATITIS C COMPLICATING PREGNANCY, ANTEPARTUM (HCC): ICD-10-CM

## 2021-05-18 DIAGNOSIS — Z3A.20 20 WEEKS GESTATION OF PREGNANCY: ICD-10-CM

## 2021-05-18 DIAGNOSIS — O98.419 CHRONIC HEPATITIS C COMPLICATING PREGNANCY, ANTEPARTUM (HCC): ICD-10-CM

## 2021-05-18 DIAGNOSIS — B18.1 CHRONIC HEPATITIS B (HCC): ICD-10-CM

## 2021-05-18 LAB
GLUCOSE UR STRIP-MCNC: NEGATIVE MG/DL
PROT UR STRIP-MCNC: NEGATIVE MG/DL

## 2021-05-18 PROCEDURE — 99213 OFFICE O/P EST LOW 20 MIN: CPT | Performed by: OBSTETRICS & GYNECOLOGY

## 2021-06-09 PROBLEM — O09.521 MULTIGRAVIDA OF ADVANCED MATERNAL AGE IN FIRST TRIMESTER: Status: RESOLVED | Noted: 2021-03-23 | Resolved: 2021-06-09

## 2021-06-09 PROBLEM — O09.522 MULTIGRAVIDA OF ADVANCED MATERNAL AGE IN SECOND TRIMESTER: Status: ACTIVE | Noted: 2021-06-09

## 2021-06-09 PROBLEM — F19.11 SUBSTANCE ABUSE IN REMISSION (HCC): Status: ACTIVE | Noted: 2021-06-09

## 2021-06-09 PROBLEM — Z34.82 PRENATAL CARE, SUBSEQUENT PREGNANCY, SECOND TRIMESTER: Status: ACTIVE | Noted: 2021-06-09

## 2021-06-28 ENCOUNTER — ROUTINE PRENATAL (OUTPATIENT)
Dept: OBSTETRICS AND GYNECOLOGY | Facility: CLINIC | Age: 41
End: 2021-06-28

## 2021-06-28 VITALS — SYSTOLIC BLOOD PRESSURE: 128 MMHG | DIASTOLIC BLOOD PRESSURE: 88 MMHG | WEIGHT: 190.2 LBS | BODY MASS INDEX: 28.92 KG/M2

## 2021-06-28 DIAGNOSIS — F19.11 SUBSTANCE ABUSE IN REMISSION (HCC): ICD-10-CM

## 2021-06-28 DIAGNOSIS — Z72.0 TOBACCO ABUSE: ICD-10-CM

## 2021-06-28 DIAGNOSIS — Z3A.25 25 WEEKS GESTATION OF PREGNANCY: Primary | ICD-10-CM

## 2021-06-28 LAB
EXPIRATION DATE: NORMAL
GLUCOSE UR STRIP-MCNC: NEGATIVE MG/DL
Lab: NORMAL
PROT UR STRIP-MCNC: NEGATIVE MG/DL

## 2021-06-28 PROCEDURE — 99213 OFFICE O/P EST LOW 20 MIN: CPT | Performed by: NURSE PRACTITIONER

## 2021-06-28 RX ORDER — PRENATAL VIT NO.126/IRON/FOLIC 28MG-0.8MG
TABLET ORAL DAILY
COMMUNITY
End: 2021-09-28

## 2021-06-28 NOTE — PROGRESS NOTES
OB FOLLOW UP  CC- Here for care of pregnancy        Melissa Hinton is a 41 y.o.  25w6d patient being seen today for her obstetrical follow up visit. Patient reports menstrual like cramping and significant swelling in her feet and ankles.     She reports that she had significant swelling in her right foot ankle that persisted x 5 days.  Swelling was occurring bilaterally, but was significantly worse on the right side.  She took off work for three days, to keep elevated and rest which she says helped.  She also reports that she has been having bilateral numbness/ tingling in her wrist and hands, and will extend through out her shoulder.  It has gotten so bad at one point that she was unable to feel a curling iron to hold it in her hand.  She says that it will often wake her 3-4 times through out the night.      C/o menstrual like cramping in her lower abdomen.    She denies LOF, vaginal spotting, headaches or vision changes.  She reports adequate fetal movements.    Instructions reviewed for 28 week appt.    Her prenatal care is complicated by (and status) :    Patient Active Problem List   Diagnosis   • Anemia   • Anxiety   • Chronic back pain   • Common migraine with intractable migraine   • Fibromyalgia   • Difficulty sleeping   • Chronic hepatitis B (CMS/HCC)   • H/O bleeding disorder   • Hiatal hernia   • Tobacco abuse   • Pregnancy   • Chronic hepatitis C complicating pregnancy, antepartum (CMS/HCC)   • Insomnia   • Multigravida of advanced maternal age in second trimester   • Substance abuse in remission (CMS/HCC)   • Prenatal care, subsequent pregnancy, second trimester     Ultrasound Today: No.    ROS -   Patient Reports : Cramping and swelling, numbness/tingling in hands/wrist  Patient Denies: Loss of Fluid, Vaginal Spotting, Vision Changes, Headaches, Nausea , Vomiting  and Epigastric pain  Fetal Movement : normal  All other systems reviewed and are negative.       The additional following  portions of the patient's history were reviewed and updated as appropriate: allergies, current medications, past family history, past medical history, past social history, past surgical history and problem list.    I have reviewed and agree with the HPI, ROS, and historical information as entered above. Terrie Mendes, APRN    /88   Wt 86.3 kg (190 lb 3.2 oz)   LMP 2020 (Exact Date)   BMI 28.92 kg/m²       EXAM:     FHT: pos BPM   Pelvic Exam: No    Urine glucose/protein: See prenatal flowsheet       Assessment and Plan    Problem List Items Addressed This Visit        Gravid and     Pregnancy - Primary    Relevant Orders    POC Protein, Urine, Qualitative, Dipstick (Completed)    POC Glucose, Urine, Qualitative, Dipstick (Completed)          1. Pregnancy at 25w6d  2. Fetal status reassuring.   3. Activity and Exercise discussed.  Return in about 2 weeks (around 2021) for [facility] glucola.   Urine was dumped today, needs UDS next visit.   Would like to transfer care to [facility]    Terrie Mendes, HAYDEE  2021

## 2021-07-21 ENCOUNTER — ROUTINE PRENATAL (OUTPATIENT)
Dept: OBSTETRICS AND GYNECOLOGY | Facility: CLINIC | Age: 41
End: 2021-07-21

## 2021-07-21 VITALS — DIASTOLIC BLOOD PRESSURE: 78 MMHG | BODY MASS INDEX: 30.26 KG/M2 | SYSTOLIC BLOOD PRESSURE: 120 MMHG | WEIGHT: 199 LBS

## 2021-07-21 DIAGNOSIS — Z34.82 PRENATAL CARE, SUBSEQUENT PREGNANCY, SECOND TRIMESTER: Primary | ICD-10-CM

## 2021-07-21 DIAGNOSIS — M79.7 FIBROMYALGIA: ICD-10-CM

## 2021-07-21 DIAGNOSIS — B18.2 CHRONIC HEPATITIS C COMPLICATING PREGNANCY, ANTEPARTUM (HCC): ICD-10-CM

## 2021-07-21 DIAGNOSIS — O09.522 MULTIGRAVIDA OF ADVANCED MATERNAL AGE IN SECOND TRIMESTER: ICD-10-CM

## 2021-07-21 DIAGNOSIS — F19.11 SUBSTANCE ABUSE IN REMISSION (HCC): ICD-10-CM

## 2021-07-21 DIAGNOSIS — O98.419 CHRONIC HEPATITIS C COMPLICATING PREGNANCY, ANTEPARTUM (HCC): ICD-10-CM

## 2021-07-21 PROCEDURE — 99214 OFFICE O/P EST MOD 30 MIN: CPT | Performed by: OBSTETRICS & GYNECOLOGY

## 2021-07-21 RX ORDER — CYCLOBENZAPRINE HCL 10 MG
5 TABLET ORAL 3 TIMES DAILY PRN
Qty: 30 TABLET | Refills: 5 | Status: SHIPPED | OUTPATIENT
Start: 2021-07-21 | End: 2021-08-20

## 2021-07-21 NOTE — PROGRESS NOTES
OB FOLLOW UP    Melissa Hinton is a 41 y.o.  29w1d patient being seen today for her obstetrical follow up visit. Patient reports pain in both arms, wakes up crying. Edema in feet. Discussed signing BTL consent, explained she does not have to go thru with it, but it needs to be signed tdy in order to get it PP, she wants to think about it.   Chronic pain management discussed today along with risks and potential benefits of flexeril as needed.   She states that her anxiety has improved with vistaril.   Discussed f/u growth scan and management plan due to AMA.   Prior prenatal records reviewed including notes, US, and labs.     Her prenatal care is complicated by (and status) :    Patient Active Problem List   Diagnosis   • Anemia   • Anxiety   • Chronic back pain   • Common migraine with intractable migraine   • Fibromyalgia   • Difficulty sleeping   • Chronic hepatitis B (CMS/HCC)   • H/O bleeding disorder   • Hiatal hernia   • Tobacco abuse   • Pregnancy   • Chronic hepatitis C complicating pregnancy, antepartum (CMS/HCC)   • Insomnia   • Multigravida of advanced maternal age in second trimester   • Substance abuse in remission (CMS/HCC)   • Prenatal care, subsequent pregnancy, second trimester       ROS -   Patient Reports : edema and pain  Patient Denies: Loss of Fluid, Vaginal Spotting, Vision Changes and Headaches  Fetal Movement : normal      /78   Wt 90.3 kg (199 lb)   LMP 2020 (Exact Date)   BMI 30.26 kg/m²     Ultrasound Today: no    EXAM:  Vitals: See prenatal flowsheet   Abdomen: Gravid, nontender to palpation   Urine glucose/protein: See prenatal flowsheet   Pelvic: Not performed.      Assessment    Diagnoses and all orders for this visit:    1. Prenatal care, subsequent pregnancy, second trimester (Primary)  -     Gestational Screen 1 Hr ([facility])  -     Antibody Screen  -     CBC (No Diff)    2. Multigravida of advanced maternal age in second trimester    3. Chronic  hepatitis C complicating pregnancy, antepartum (CMS/HCC)    4. Substance abuse in remission (CMS/[facility])    5. Fibromyalgia    Other orders  -     cyclobenzaprine (FLEXERIL) 10 MG tablet; Take 0.5 tablets by mouth 3 (Three) Times a Day As Needed for Muscle Spasms for up to 30 days.  Dispense: 30 tablet; Refill: 5        1. Pregnancy at 29w1d  2. Fetal status reassuring     Problem List Items Addressed This Visit        Gravid and     Chronic hepatitis C complicating pregnancy, antepartum (CMS/HCC)    Multigravida of advanced maternal age in second trimester    Prenatal care, subsequent pregnancy, second trimester - Primary    Relevant Orders    Gestational Screen 1 Hr ([facility])    Antibody Screen    CBC (No Diff)       Mental Health    Substance abuse in remission (CMS/[facility])    Overview     10 months clean, did 21 day inpatient and 32 IOP visits. Not currently in counseling, states that it is not helpful for her.             Musculoskeletal and Injuries    Fibromyalgia    Relevant Medications    hydrOXYzine pamoate (VISTARIL) 50 MG capsule          Plan    1. Fetal movement/ Labor Precautions  2. High risk pregnancy due to AMA  3.   1hr GTT today.   4.   Will perform growth US at f/u visit.   5.   Will start back on flexeril as needed for chronic pain due to fibromyalgia.   6.  F/u in 2 weeks.         Omar Gallegos MD  2021

## 2021-07-21 NOTE — PATIENT INSTRUCTIONS
Prenatal Care  Prenatal care is health care during pregnancy. It helps you and your unborn baby (fetus) stay as healthy as possible. Prenatal care may be provided by a midwife, a family practice health care provider, or a childbirth and pregnancy specialist (obstetrician).  How does this affect me?  During pregnancy, you will be closely monitored for any new conditions that might develop. To lower your risk of pregnancy complications, you and your health care provider will talk about any underlying conditions you have.  How does this affect my baby?  Early and consistent prenatal care increases the chance that your baby will be healthy during pregnancy. Prenatal care lowers the risk that your baby will be:  · Born early (prematurely).  · Smaller than expected at birth (small for gestational age).  What can I expect at the first prenatal care visit?  Your first prenatal care visit will likely be the longest. You should schedule your first prenatal care visit as soon as you know that you are pregnant. Your first visit is a good time to talk about any questions or concerns you have about pregnancy. At your visit, you and your health care provider will talk about:  · Your medical history, including:  ? Any past pregnancies.  ? Your family's medical history.  ? The baby's father's medical history.  ? Any long-term (chronic) health conditions you have and how you manage them.  ? Any surgeries or procedures you have had.  ? Any current over-the-counter or prescription medicines, herbs, or supplements you are taking.  · Other factors that could pose a risk to your baby, including:  · Your home setting and your stress levels, including:  ? Exposure to abuse or violence.  ? Household financial strain.  ? Mental health conditions you have.  · Your daily health habits, including diet and exercise.  Your health care provider will also:  · Measure your weight, height, and blood pressure.  · Do a physical exam, including a pelvic  and breast exam.  · Perform blood tests and urine tests to check for:  ? Urinary tract infection.  ? Sexually transmitted infections (STIs).  ? Low iron levels in your blood (anemia).  ? Blood type and certain proteins on red blood cells (Rh antibodies).  ? Infections and immunity to viruses, such as hepatitis B and rubella.  ? HIV (human immunodeficiency virus).  · Do an ultrasound to confirm your baby's growth and development and to help predict your estimated due date (RA). This ultrasound is done with a probe that is inserted into the vagina (transvaginal ultrasound).  · Discuss your options for genetic screening.  · Give you information about how to keep yourself and your baby healthy, including:  ? Nutrition and taking vitamins.  ? Physical activity.  ? How to manage pregnancy symptoms such as nausea and vomiting (morning sickness).  ? Infections and substances that may be harmful to your baby and how to avoid them.  ? Food safety.  ? Dental care.  ? Working.  ? Travel.  ? Warning signs to watch for and when to call your health care provider.  How often will I have prenatal care visits?  After your first prenatal care visit, you will have regular visits throughout your pregnancy. The visit schedule is often as follows:  · Up to week 28 of pregnancy: once every 4 weeks.  · 28-36 weeks: once every 2 weeks.  · After 36 weeks: every week until delivery.  Some women may have visits more or less often depending on any underlying health conditions and the health of the baby.  Keep all follow-up and prenatal care visits as told by your health care provider. This is important.  What happens during routine prenatal care visits?  Your health care provider will:  · Measure your weight and blood pressure.  · Check for fetal heart sounds.  · Measure the height of your uterus in your abdomen (fundal height). This may be measured starting around week 20 of pregnancy.  · Check the position of your baby inside your  uterus.  · Ask questions about your diet, sleeping patterns, and whether you can feel the baby move.  · Review warning signs to watch for and signs of labor.  · Ask about any pregnancy symptoms you are having and how you are dealing with them. Symptoms may include:  ? Headaches.  ? Nausea and vomiting.  ? Vaginal discharge.  ? Swelling.  ? Fatigue.  ? Constipation.  ? Any discomfort, including back or pelvic pain.  Make a list of questions to ask your health care provider at your routine visits.  What tests might I have during prenatal care visits?  You may have blood, urine, and imaging tests throughout your pregnancy, such as:  · Urine tests to check for glucose, protein, or signs of infection.  · Glucose tests to check for a form of diabetes that can develop during pregnancy (gestational diabetes mellitus). This is usually done around week 24 of pregnancy.  · An ultrasound to check your baby's growth and development and to check for birth defects. This is usually done around week 20 of pregnancy.  · A test to check for group B strep (GBS) infection. This is usually done around week 36 of pregnancy.  · Genetic testing. This may include blood or imaging tests, such as an ultrasound. Some genetic tests are done during the first trimester and some are done during the second trimester.  What else can I expect during prenatal care visits?  Your health care provider may recommend getting certain vaccines during pregnancy. These may include:  · A yearly flu shot (annual influenza vaccine). This is especially important if you will be pregnant during flu season.  · Tdap (tetanus, diphtheria, pertussis) vaccine. Getting this vaccine during pregnancy can protect your baby from whooping cough (pertussis) after birth. This vaccine may be recommended between weeks 27 and 36 of pregnancy.  Later in your pregnancy, your health care provider may give you information about:  · Childbirth and breastfeeding classes.  · Choosing a  health care provider for your baby.  · Umbilical cord banking.  · Breastfeeding.  · Birth control after your baby is born.  · The hospital labor and delivery unit and how to tour it.  · Registering at the hospital before you go into labor.  Where to find more information  · Office on Women's Health: womenshealth.gov  · American Pregnancy Association: americanpregnancy.org  · March of Dimes: marchofdimes.org  Summary  · Prenatal care helps you and your baby stay as healthy as possible during pregnancy.  · Your first prenatal care visit will most likely be the longest.  · You will have visits and tests throughout your pregnancy to monitor your health and your baby's health.  · Bring a list of questions to your visits to ask your health care provider.  · Make sure to keep all follow-up and prenatal care visits with your health care provider.  This information is not intended to replace advice given to you by your health care provider. Make sure you discuss any questions you have with your health care provider.  Document Revised: 04/08/2020 Document Reviewed: 12/17/2018  Elsevier Patient Education © 2021 [employer]vier Inc.

## 2021-07-22 LAB
BLD GP AB SCN SERPL QL: NEGATIVE
ERYTHROCYTE [DISTWIDTH] IN BLOOD BY AUTOMATED COUNT: 12.8 % (ref 12.3–15.4)
GLUCOSE 1H P 50 G GLC PO SERPL-MCNC: 117 MG/DL (ref 65–139)
HCT VFR BLD AUTO: 35.1 % (ref 34–46.6)
HGB BLD-MCNC: 12 G/DL (ref 12–15.9)
MCH RBC QN AUTO: 30.5 PG (ref 26.6–33)
MCHC RBC AUTO-ENTMCNC: 34.2 G/DL (ref 31.5–35.7)
MCV RBC AUTO: 89.3 FL (ref 79–97)
PLATELET # BLD AUTO: 240 10*3/MM3 (ref 140–450)
RBC # BLD AUTO: 3.93 10*6/MM3 (ref 3.77–5.28)
WBC # BLD AUTO: 7.52 10*3/MM3 (ref 3.4–10.8)

## 2021-07-26 ENCOUNTER — TELEPHONE (OUTPATIENT)
Dept: OBSTETRICS AND GYNECOLOGY | Facility: CLINIC | Age: 41
End: 2021-07-26

## 2021-07-26 NOTE — TELEPHONE ENCOUNTER
Pt called back returning our call from Friday. She was unsure what they were about but believes it was about her GTT results

## 2021-08-04 ENCOUNTER — ROUTINE PRENATAL (OUTPATIENT)
Dept: OBSTETRICS AND GYNECOLOGY | Facility: CLINIC | Age: 41
End: 2021-08-04

## 2021-08-04 VITALS — BODY MASS INDEX: 31.02 KG/M2 | WEIGHT: 204 LBS | SYSTOLIC BLOOD PRESSURE: 138 MMHG | DIASTOLIC BLOOD PRESSURE: 80 MMHG

## 2021-08-04 DIAGNOSIS — G89.4 CHRONIC PAIN DISORDER: ICD-10-CM

## 2021-08-04 DIAGNOSIS — Z3A.31 31 WEEKS GESTATION OF PREGNANCY: Primary | ICD-10-CM

## 2021-08-04 LAB
EXPIRATION DATE: 0
GLUCOSE UR STRIP-MCNC: NEGATIVE MG/DL
Lab: 0
PROT UR STRIP-MCNC: NEGATIVE MG/DL

## 2021-08-04 PROCEDURE — 99213 OFFICE O/P EST LOW 20 MIN: CPT | Performed by: OBSTETRICS & GYNECOLOGY

## 2021-08-04 NOTE — PROGRESS NOTES
OB FOLLOW UP    Melissa Hinton is a 41 y.o.  31w1d patient being seen today for her obstetrical follow up visit. Patient reports backache, fatigue, headache, nausea and edema, blurry vision sometimes .     AMA- US performed today for growth with normal growth and fluid.     Chronic pain- flexeril not helping with neuropathic pain. We discussed antidepressant medications and she declines at this time. Discussed f/u with MFM for recommendations concerning treatment options and delivery timing.     Fetus now breech- will continue to monitor. If still breech at term, will proceed with  section.     Her prenatal care is complicated by (and status) :    Patient Active Problem List   Diagnosis   • Anemia   • Anxiety   • Chronic back pain   • Common migraine with intractable migraine   • Fibromyalgia   • Difficulty sleeping   • Chronic hepatitis B (CMS/HCC)   • H/O bleeding disorder   • Hiatal hernia   • Tobacco abuse   • Pregnancy   • Chronic hepatitis C complicating pregnancy, antepartum (CMS/HCC)   • Insomnia   • Multigravida of advanced maternal age in second trimester   • Substance abuse in remission (CMS/HCC)   • Prenatal care, subsequent pregnancy, second trimester       ROS -   Patient Reports : Headaches and backache, fatigue, nausea, edema, blurry vision.   Patient Denies: Loss of Fluid, Vaginal Spotting and Cramping/Contractions   Fetal Movement : normal      /80   Wt 92.5 kg (204 lb)   LMP 2020 (Exact Date)   BMI 31.02 kg/m²     Ultrasound Today: yes    EXAM:  Vitals: See prenatal flowsheet   Abdomen: Gravid, nontender to palpation   Urine glucose/protein: See prenatal flowsheet   Pelvic: Not performed.      Assessment    1. Pregnancy at 31w1d  2. Fetal status reassuring     Problem List Items Addressed This Visit        Gravid and     Pregnancy - Primary    Relevant Orders    POC Glucose, Urine, Qualitative, Dipstick (Completed)    POC Protein, Urine, Qualitative,  Dipstick (Completed)      Other Visit Diagnoses     Chronic pain disorder        Relevant Orders    [facility]          Plan    1. Fetal movement/ Labor Precautions  2. Fetal movement/PTL or Labor precautions  3. Reviewed routine prenatal care with the office and educational materials given  4. Medication(s) Ordered  5. Patient is on Prenatal vitamins  6. Referral to [facility] Ordered  7. Follow up: 2 week(s)      Omar Gallegos MD  2021

## 2021-08-16 ENCOUNTER — TELEPHONE (OUTPATIENT)
Dept: OBSTETRICS AND GYNECOLOGY | Facility: CLINIC | Age: 41
End: 2021-08-16

## 2021-08-16 NOTE — TELEPHONE ENCOUNTER
Yes, she is going to [facility] because she is high risk pregnancy.  Not only because of her advanced maternal age, but also due to her chronic pain.  At this point, I don't have anything to offer her for pain, but maybe they could.  She knows all of this. We discussed it at her last visit.

## 2021-08-16 NOTE — TELEPHONE ENCOUNTER
Pt notified. We is going 8/25/2021. 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 Pt understands yohant  wants.

## 2021-08-16 NOTE — TELEPHONE ENCOUNTER
Pt lvm asking for a call back to reschedule her appt for tomorrow with . I called her back to reschedule before I could  reschedule her she began to ask me about why she was sent to [facility]. I explained to her that it is for high risk pregnancy. She thought that she was being referred to a provider for the pain in her arms and hands. But she was referred to [facility]. She is very upset for not being told where she was being referred to. She would like a call back as soon as possible. I tired to help calm her down and assure her it was okay that she would not be able to come to her appt tomorrow. I also let her know that a nurse would call her back, and that frederick or one of the nurses would give her a call back as soon as they are available.

## 2021-08-17 ENCOUNTER — APPOINTMENT (OUTPATIENT)
Dept: WOMENS IMAGING | Facility: HOSPITAL | Age: 41
End: 2021-08-17

## 2021-08-24 ENCOUNTER — TELEPHONE (OUTPATIENT)
Dept: OBSTETRICS AND GYNECOLOGY | Facility: CLINIC | Age: 41
End: 2021-08-24

## 2021-08-24 NOTE — TELEPHONE ENCOUNTER
S/w pt she states her fiances son had been exposed to covid yesterday at  with a confirmed case.  has been shut down as of today, and patient has not been around kid since yesterday. Patient states she is going to her dads house tonight to stay to stay away from kid just in case. Patient declines being vaccinated as well. I told her to just be cautious and wear her mask, and if she starts to show any symptoms to contact us and health department. She v/u and will be here for her appt tomorrow and with [facility].

## 2021-08-24 NOTE — TELEPHONE ENCOUNTER
Pt called an stated that her fiances son has been exposed to covid but is showing no symptoms and she has an apt tomorrow   and is wanting to know if she should still come or not.

## 2021-08-25 ENCOUNTER — HOSPITAL ENCOUNTER (INPATIENT)
Facility: HOSPITAL | Age: 41
LOS: 4 days | Discharge: HOME | End: 2021-08-29
Attending: OBSTETRICS & GYNECOLOGY | Admitting: OBSTETRICS & GYNECOLOGY

## 2021-08-25 ENCOUNTER — ROUTINE PRENATAL (OUTPATIENT)
Dept: OBSTETRICS AND GYNECOLOGY | Facility: CLINIC | Age: 41
End: 2021-08-25

## 2021-08-25 ENCOUNTER — HOSPITAL ENCOUNTER (OUTPATIENT)
Dept: WOMENS IMAGING | Facility: HOSPITAL | Age: 41
Discharge: HOME | End: 2021-08-25
Admitting: OBSTETRICS & GYNECOLOGY

## 2021-08-25 ENCOUNTER — OFFICE VISIT (OUTPATIENT)
Dept: OBSTETRICS AND GYNECOLOGY | Facility: HOSPITAL | Age: 41
End: 2021-08-25

## 2021-08-25 VITALS — BODY MASS INDEX: 31.02 KG/M2 | WEIGHT: 204 LBS | SYSTOLIC BLOOD PRESSURE: 170 MMHG | DIASTOLIC BLOOD PRESSURE: 100 MMHG

## 2021-08-25 VITALS — WEIGHT: 205 LBS | BODY MASS INDEX: 31.17 KG/M2

## 2021-08-25 DIAGNOSIS — M79.7 FIBROMYALGIA: ICD-10-CM

## 2021-08-25 DIAGNOSIS — O13.3 GESTATIONAL HYPERTENSION, THIRD TRIMESTER: ICD-10-CM

## 2021-08-25 DIAGNOSIS — Z3A.34 34 WEEKS GESTATION OF PREGNANCY: Primary | ICD-10-CM

## 2021-08-25 DIAGNOSIS — M79.7 FIBROMYALGIA: Primary | ICD-10-CM

## 2021-08-25 DIAGNOSIS — G89.4 CHRONIC PAIN DISORDER: ICD-10-CM

## 2021-08-25 PROBLEM — O13.9 GESTATIONAL HYPERTENSION: Status: ACTIVE | Noted: 2021-08-25

## 2021-08-25 LAB
ABO GROUP BLD: NORMAL
ALP SERPL-CCNC: 108 U/L (ref 39–117)
ALT SERPL W P-5'-P-CCNC: 5 U/L (ref 1–33)
AST SERPL-CCNC: 12 U/L (ref 1–32)
BILIRUB SERPL-MCNC: 0.2 MG/DL (ref 0–1.2)
BLD GP AB SCN SERPL QL: NEGATIVE
CREAT SERPL-MCNC: 0.52 MG/DL (ref 0.57–1)
DEPRECATED RDW RBC AUTO: 40.9 FL (ref 37–54)
ERYTHROCYTE [DISTWIDTH] IN BLOOD BY AUTOMATED COUNT: 13.1 % (ref 12.3–15.4)
GLUCOSE UR STRIP-MCNC: ABNORMAL MG/DL
HCT VFR BLD AUTO: 32.7 % (ref 34–46.6)
HGB BLD-MCNC: 11.4 G/DL (ref 12–15.9)
LDH SERPL-CCNC: 175 U/L (ref 135–214)
MCH RBC QN AUTO: 30.4 PG (ref 26.6–33)
MCHC RBC AUTO-ENTMCNC: 34.9 G/DL (ref 31.5–35.7)
MCV RBC AUTO: 87.2 FL (ref 79–97)
PLATELET # BLD AUTO: 209 10*3/MM3 (ref 140–450)
PMV BLD AUTO: 12.1 FL (ref 6–12)
PROT UR STRIP-MCNC: NEGATIVE MG/DL
RBC # BLD AUTO: 3.75 10*6/MM3 (ref 3.77–5.28)
RH BLD: POSITIVE
SARS-COV-2 RDRP RESP QL NAA+PROBE: NORMAL
T&S EXPIRATION DATE: NORMAL
URATE SERPL-MCNC: 4.6 MG/DL (ref 2.4–5.7)
WBC # BLD AUTO: 8.38 10*3/MM3 (ref 3.4–10.8)

## 2021-08-25 PROCEDURE — 99024 POSTOP FOLLOW-UP VISIT: CPT | Performed by: OBSTETRICS & GYNECOLOGY

## 2021-08-25 PROCEDURE — 76811 OB US DETAILED SNGL FETUS: CPT | Performed by: OBSTETRICS & GYNECOLOGY

## 2021-08-25 PROCEDURE — 84550 ASSAY OF BLOOD/URIC ACID: CPT | Performed by: OBSTETRICS & GYNECOLOGY

## 2021-08-25 PROCEDURE — 84450 TRANSFERASE (AST) (SGOT): CPT | Performed by: OBSTETRICS & GYNECOLOGY

## 2021-08-25 PROCEDURE — 82565 ASSAY OF CREATININE: CPT | Performed by: OBSTETRICS & GYNECOLOGY

## 2021-08-25 PROCEDURE — 87635 SARS-COV-2 COVID-19 AMP PRB: CPT | Performed by: OBSTETRICS & GYNECOLOGY

## 2021-08-25 PROCEDURE — 99213 OFFICE O/P EST LOW 20 MIN: CPT | Performed by: OBSTETRICS & GYNECOLOGY

## 2021-08-25 PROCEDURE — 25010000002 MORPHINE PER 10 MG: Performed by: OBSTETRICS & GYNECOLOGY

## 2021-08-25 PROCEDURE — 86901 BLOOD TYPING SEROLOGIC RH(D): CPT | Performed by: OBSTETRICS & GYNECOLOGY

## 2021-08-25 PROCEDURE — 85027 COMPLETE CBC AUTOMATED: CPT | Performed by: OBSTETRICS & GYNECOLOGY

## 2021-08-25 PROCEDURE — 25010000002 BETAMETHASONE ACET & SOD PHOS PER 4 MG: Performed by: OBSTETRICS & GYNECOLOGY

## 2021-08-25 PROCEDURE — 84075 ASSAY ALKALINE PHOSPHATASE: CPT | Performed by: OBSTETRICS & GYNECOLOGY

## 2021-08-25 PROCEDURE — 25010000003 MAGNESIUM SULFATE 20 GM/500ML SOLUTION: Performed by: OBSTETRICS & GYNECOLOGY

## 2021-08-25 PROCEDURE — 86900 BLOOD TYPING SEROLOGIC ABO: CPT | Performed by: OBSTETRICS & GYNECOLOGY

## 2021-08-25 PROCEDURE — 82247 BILIRUBIN TOTAL: CPT | Performed by: OBSTETRICS & GYNECOLOGY

## 2021-08-25 PROCEDURE — 84460 ALANINE AMINO (ALT) (SGPT): CPT | Performed by: OBSTETRICS & GYNECOLOGY

## 2021-08-25 PROCEDURE — 76811 OB US DETAILED SNGL FETUS: CPT

## 2021-08-25 PROCEDURE — S0260 H&P FOR SURGERY: HCPCS | Performed by: OBSTETRICS & GYNECOLOGY

## 2021-08-25 PROCEDURE — 83615 LACTATE (LD) (LDH) ENZYME: CPT | Performed by: OBSTETRICS & GYNECOLOGY

## 2021-08-25 PROCEDURE — 63710000001 PROMETHAZINE PER 25 MG: Performed by: OBSTETRICS & GYNECOLOGY

## 2021-08-25 PROCEDURE — 86850 RBC ANTIBODY SCREEN: CPT | Performed by: OBSTETRICS & GYNECOLOGY

## 2021-08-25 RX ORDER — LIDOCAINE HYDROCHLORIDE 10 MG/ML
5 INJECTION, SOLUTION EPIDURAL; INFILTRATION; INTRACAUDAL; PERINEURAL AS NEEDED
Status: DISCONTINUED | OUTPATIENT
Start: 2021-08-25 | End: 2021-08-29 | Stop reason: HOSPADM

## 2021-08-25 RX ORDER — MAGNESIUM SULFATE HEPTAHYDRATE 40 MG/ML
3 INJECTION, SOLUTION INTRAVENOUS CONTINUOUS
Status: DISCONTINUED | OUTPATIENT
Start: 2021-08-25 | End: 2021-08-26

## 2021-08-25 RX ORDER — PRENATAL VIT/IRON FUM/FOLIC AC 27MG-0.8MG
1 TABLET ORAL DAILY
Status: DISCONTINUED | OUTPATIENT
Start: 2021-08-25 | End: 2021-08-29 | Stop reason: HOSPADM

## 2021-08-25 RX ORDER — SODIUM CHLORIDE 0.9 % (FLUSH) 0.9 %
10 SYRINGE (ML) INJECTION EVERY 12 HOURS SCHEDULED
Status: DISCONTINUED | OUTPATIENT
Start: 2021-08-25 | End: 2021-08-29 | Stop reason: HOSPADM

## 2021-08-25 RX ORDER — ACETAMINOPHEN 500 MG
500 TABLET ORAL EVERY 6 HOURS PRN
COMMUNITY

## 2021-08-25 RX ORDER — LABETALOL HYDROCHLORIDE 5 MG/ML
20-80 INJECTION, SOLUTION INTRAVENOUS
Status: DISPENSED | OUTPATIENT
Start: 2021-08-25 | End: 2021-08-26

## 2021-08-25 RX ORDER — NAPROXEN SODIUM 220 MG
220 TABLET ORAL 2 TIMES DAILY PRN
COMMUNITY
End: 2022-04-07

## 2021-08-25 RX ORDER — DEXTROSE AND SODIUM CHLORIDE 5; .2 G/100ML; G/100ML
75 INJECTION, SOLUTION INTRAVENOUS CONTINUOUS
Status: DISCONTINUED | OUTPATIENT
Start: 2021-08-25 | End: 2021-08-27

## 2021-08-25 RX ORDER — SODIUM CHLORIDE 0.9 % (FLUSH) 0.9 %
10 SYRINGE (ML) INJECTION AS NEEDED
Status: DISCONTINUED | OUTPATIENT
Start: 2021-08-25 | End: 2021-08-29 | Stop reason: HOSPADM

## 2021-08-25 RX ORDER — PROMETHAZINE HYDROCHLORIDE 25 MG/1
25 TABLET ORAL ONCE
Status: COMPLETED | OUTPATIENT
Start: 2021-08-25 | End: 2021-08-25

## 2021-08-25 RX ORDER — ONDANSETRON 4 MG/1
4 TABLET, FILM COATED ORAL EVERY 8 HOURS PRN
Status: DISCONTINUED | OUTPATIENT
Start: 2021-08-25 | End: 2021-08-29 | Stop reason: HOSPADM

## 2021-08-25 RX ORDER — CYCLOBENZAPRINE HCL 10 MG
10 TABLET ORAL 3 TIMES DAILY PRN
COMMUNITY
End: 2021-09-28 | Stop reason: SDUPTHER

## 2021-08-25 RX ORDER — ACETAMINOPHEN 325 MG/1
650 TABLET ORAL EVERY 4 HOURS PRN
Status: DISCONTINUED | OUTPATIENT
Start: 2021-08-25 | End: 2021-08-28

## 2021-08-25 RX ORDER — LABETALOL 200 MG/1
200 TABLET, FILM COATED ORAL EVERY 8 HOURS SCHEDULED
Status: DISCONTINUED | OUTPATIENT
Start: 2021-08-25 | End: 2021-08-27

## 2021-08-25 RX ORDER — BETAMETHASONE SODIUM PHOSPHATE AND BETAMETHASONE ACETATE 3; 3 MG/ML; MG/ML
12 INJECTION, SUSPENSION INTRA-ARTICULAR; INTRALESIONAL; INTRAMUSCULAR; SOFT TISSUE EVERY 24 HOURS
Status: DISCONTINUED | OUTPATIENT
Start: 2021-08-25 | End: 2021-08-26

## 2021-08-25 RX ORDER — MORPHINE SULFATE 10 MG/ML
15 INJECTION INTRAMUSCULAR; INTRAVENOUS; SUBCUTANEOUS ONCE
Status: COMPLETED | OUTPATIENT
Start: 2021-08-25 | End: 2021-08-25

## 2021-08-25 RX ORDER — ONDANSETRON 2 MG/ML
4 INJECTION INTRAMUSCULAR; INTRAVENOUS EVERY 8 HOURS PRN
Status: DISCONTINUED | OUTPATIENT
Start: 2021-08-25 | End: 2021-08-29 | Stop reason: HOSPADM

## 2021-08-25 RX ADMIN — DEXTROSE AND SODIUM CHLORIDE 75 ML/HR: 5; 200 INJECTION, SOLUTION INTRAVENOUS at 19:48

## 2021-08-25 RX ADMIN — MAGNESIUM SULFATE HEPTAHYDRATE 2 G/HR: 40 INJECTION, SOLUTION INTRAVENOUS at 20:15

## 2021-08-25 RX ADMIN — ACETAMINOPHEN 650 MG: 325 TABLET, FILM COATED ORAL at 19:19

## 2021-08-25 RX ADMIN — DEXTROSE AND SODIUM CHLORIDE 75 ML/HR: 5; 200 INJECTION, SOLUTION INTRAVENOUS at 18:00

## 2021-08-25 RX ADMIN — MORPHINE SULFATE 15 MG: 10 INJECTION INTRAVENOUS at 22:41

## 2021-08-25 RX ADMIN — LABETALOL 20 MG/4 ML (5 MG/ML) INTRAVENOUS SYRINGE 20 MG: at 19:13

## 2021-08-25 RX ADMIN — PROMETHAZINE HYDROCHLORIDE 25 MG: 25 TABLET ORAL at 22:28

## 2021-08-25 RX ADMIN — LIDOCAINE HYDROCHLORIDE 5 ML: 10 INJECTION, SOLUTION EPIDURAL; INFILTRATION; INTRACAUDAL; PERINEURAL at 17:48

## 2021-08-25 RX ADMIN — BETAMETHASONE SODIUM PHOSPHATE AND BETAMETHASONE ACETATE 12 MG: 3; 3 INJECTION, SUSPENSION INTRA-ARTICULAR; INTRALESIONAL; INTRAMUSCULAR at 18:44

## 2021-08-25 RX ADMIN — LABETALOL 20 MG/4 ML (5 MG/ML) INTRAVENOUS SYRINGE 20 MG: at 18:15

## 2021-08-25 RX ADMIN — LABETALOL 20 MG/4 ML (5 MG/ML) INTRAVENOUS SYRINGE 40 MG: at 19:25

## 2021-08-25 RX ADMIN — LABETALOL HYDROCHLORIDE 200 MG: 200 TABLET, FILM COATED ORAL at 21:17

## 2021-08-25 RX ADMIN — LABETALOL 20 MG/4 ML (5 MG/ML) INTRAVENOUS SYRINGE 40 MG: at 18:30

## 2021-08-25 NOTE — PATIENT INSTRUCTIONS
Hypertension During Pregnancy  Hypertension is also called high blood pressure. High blood pressure means that the force of your blood moving in your body is too strong. It can cause problems for you and your baby. Different types of high blood pressure can happen during pregnancy. The types are:  · High blood pressure before you got pregnant. This is called chronic hypertension.  This can continue during your pregnancy. Your doctor will want to keep checking your blood pressure. You may need medicine to keep your blood pressure under control while you are pregnant. You will need follow-up visits after you have your baby.  · High blood pressure that goes up during pregnancy when it was normal before. This is called gestational hypertension. It will usually get better after you have your baby, but your doctor will need to watch your blood pressure to make sure that it is getting better.  · Very high blood pressure during pregnancy. This is called preeclampsia. Very high blood pressure is an emergency that needs to be checked and treated right away.  · You may develop very high blood pressure after giving birth. This is called postpartum preeclampsia. This usually occurs within 48 hours after childbirth but may occur up to 6 weeks after giving birth. This is rare.  How does this affect me?  If you have high blood pressure during pregnancy, you have a higher chance of developing high blood pressure:  · As you get older.  · If you get pregnant again.  In some cases, high blood pressure during pregnancy can cause:  · Stroke.  · Heart attack.  · Damage to the kidneys, lungs, or liver.  · Preeclampsia.  · Jerky movements you cannot control (convulsions or seizures).  · Problems with the placenta.  How does this affect my baby?  Your baby may:  · Be born early.  · Not weigh as much as he or she should.  · Not handle labor well, leading to a  birth.  What are the risks?  · Having high blood pressure during a past  pregnancy.  · Being overweight.  · Being 35 years old or older.  · Being pregnant for the first time.  · Being pregnant with more than one baby.  · Becoming pregnant using fertility methods, such as IVF.  · Having other problems, such as diabetes, or kidney disease.  · Having family members who have high blood pressure.  What can I do to lower my risk?    · Keep a healthy weight.  · Eat a healthy diet.  · Follow what your doctor tells you about treating any medical problems that you had before becoming pregnant.  It is very important to go to all of your doctor visits. Your doctor will check your blood pressure and make sure that your pregnancy is progressing as it should. Treatment should start early if a problem is found.  How is this treated?  Treatment for high blood pressure during pregnancy can differ depending on the type of high blood pressure you have and how serious it is.  · You may need to take blood pressure medicine.  · If you have been taking medicine for your blood pressure, you may need to change the medicine during pregnancy if it is not safe for your baby.  · If your doctor thinks that you could get very high blood pressure, he or she may tell you to take a low-dose aspirin during your pregnancy.  · If you have very high blood pressure, you may need to stay in the hospital so you and your baby can be watched closely. You may also need to take medicine to lower your blood pressure. This medicine may be given by mouth or through an IV tube.  · In some cases, if your condition gets worse, you may need to have your baby early.  Follow these instructions at home:  Eating and drinking    · Drink enough fluid to keep your pee (urine) pale yellow.  · Avoid caffeine.  Lifestyle  · Do not use any products that contain nicotine or tobacco, such as cigarettes, e-cigarettes, and chewing tobacco. If you need help quitting, ask your doctor.  · Do not use alcohol or drugs.  · Avoid stress.  · Rest and get plenty  of sleep.  · Regular exercise can help. Ask your doctor what kinds of exercise are best for you.  General instructions  · Take over-the-counter and prescription medicines only as told by your doctor.  · Keep all prenatal and follow-up visits as told by your doctor. This is important.  Contact a doctor if:  · You have symptoms that your doctor told you to watch for, such as:  ? Headaches.  ? Nausea.  ? Vomiting.  ? Belly (abdominal) pain.  ? Dizziness.  ? Light-headedness.  Get help right away if:  · You have:  ? Very bad belly pain that does not get better with treatment.  ? A very bad headache that does not get better.  ? Vomiting that does not get better.  ? Sudden, fast weight gain.  ? Sudden swelling in your hands, ankles, or face.  ? Bleeding from your vagina.  ? Blood in your pee.  ? Blurry vision.  ? Double vision.  ? Shortness of breath.  ? Chest pain.  ? Weakness on one side of your body.  ? Trouble talking.  · Your baby is not moving as much as usual.  Summary  · High blood pressure is also called hypertension.  · High blood pressure means that the force of your blood moving in your body is too strong.  · High blood pressure can cause problems for you and your baby.  · Keep all follow-up visits as told by your doctor. This is important.  This information is not intended to replace advice given to you by your health care provider. Make sure you discuss any questions you have with your health care provider.  Document Revised: 04/07/2021 Document Reviewed: 01/14/2020  Elsevier Patient Education © 2021 [employer] Inc.

## 2021-08-25 NOTE — PROGRESS NOTES
Patient seen in Maternal Fetal Medicine clinic today. Please see full note in under imaging tab of patient chart in Epic (Viewpoint report).    Candi Thompson MD

## 2021-08-25 NOTE — PROGRESS NOTES
OB FOLLOW UP    Melissa Hinton is a 41 y.o.  34w1d patient being seen today for her obstetrical follow up visit. Patient reports frustration over the pain in her hands. Pt is very angry that no one mentioned PT for her hands until [facility] did this AM. She had a BPP  @ [facility]. Good fm,no ctx  Elevated BP today and we discussed admission for further work up and possible delivery.   We discussed breech presentation and will proceed with  delivery if indicated.     Her prenatal care is complicated by (and status) :    Patient Active Problem List   Diagnosis   • Anemia   • Anxiety   • Chronic back pain   • Common migraine with intractable migraine   • Fibromyalgia   • Difficulty sleeping   • Chronic hepatitis B (CMS/HCC)   • H/O bleeding disorder   • Hiatal hernia   • Tobacco abuse   • Pregnancy   • Chronic hepatitis C complicating pregnancy, antepartum (CMS/HCC)   • Insomnia   • Multigravida of advanced maternal age in second trimester   • Substance abuse in remission (CMS/HCC)   • Prenatal care, subsequent pregnancy, second trimester   • Gestational hypertension, third trimester       ROS -   Patient Reports : bilateral hand pain  Patient Denies: Loss of Fluid, Vaginal Spotting, Vision Changes, Headaches and Cramping/Contractions   Fetal Movement : normal      /100   Wt 92.5 kg (204 lb)   LMP 2020 (Exact Date)   BMI 31.02 kg/m²     Ultrasound Today: yes    EXAM:  Vitals: See prenatal flowsheet   Abdomen: Gravid, nontender to palpation   Urine glucose/protein: See prenatal flowsheet   Pelvic: Not performed     Assessment    Diagnoses and all orders for this visit:    1. 34 weeks gestation of pregnancy (Primary)  -     POC Urinalysis Dipstick, Automated    2. Fibromyalgia    3. Gestational hypertension, third trimester        1. Pregnancy at 34w1d  2. Fetal status reassuring     Problem List Items Addressed This Visit        Gravid and     Pregnancy - Primary    Relevant Orders     POC Urinalysis Dipstick, Automated (Completed)    Gestational hypertension, third trimester       Musculoskeletal and Injuries    Fibromyalgia    Relevant Medications    hydrOXYzine pamoate (VISTARIL) 50 MG capsule          Plan  1.  Due to severe range pressures in office today, will arrange for admission for labs, serial BP and magnesium sulfate if needed.         Omar Gallegos MD  08/25/2021

## 2021-08-25 NOTE — PROGRESS NOTES
"Complains of constant hand/arm pain. \"I'm used to the pain in my legs but the pain in my hands and arms is unbearable.\"  \"I can't sleep because of the pain.\" \"I don't trust any doctors and I feel like I'm not listened to.\"  Does not see a therapist or counselor. Pt pulled BP cuff off due to pain in her arm, BP not obtained.  To see Dr. Gallegos today.  "

## 2021-08-26 ENCOUNTER — ANESTHESIA (OUTPATIENT)
Dept: LABOR AND DELIVERY | Facility: HOSPITAL | Age: 41
End: 2021-08-26

## 2021-08-26 ENCOUNTER — ANESTHESIA EVENT (OUTPATIENT)
Dept: LABOR AND DELIVERY | Facility: HOSPITAL | Age: 41
End: 2021-08-26

## 2021-08-26 LAB
ATMOSPHERIC PRESS: ABNORMAL MM[HG]
ATMOSPHERIC PRESS: ABNORMAL MM[HG]
BASE EXCESS BLDCOA CALC-SCNC: -3.4 MMOL/L (ref 0–2)
BASE EXCESS BLDCOV CALC-SCNC: -3.1 MMOL/L (ref 0–2)
BDY SITE: ABNORMAL
BDY SITE: ABNORMAL
BODY TEMPERATURE: 37 C
BODY TEMPERATURE: 37 C
CO2 BLDA-SCNC: 27.2 MMOL/L (ref 22–33)
CO2 BLDA-SCNC: 27.5 MMOL/L (ref 22–33)
COLLECT TME SMN: ABNORMAL
EPAP: 0
EPAP: 0
HCO3 BLDCOA-SCNC: 25.4 MMOL/L (ref 16.9–20.5)
HCO3 BLDCOV-SCNC: 25.7 MMOL/L (ref 18.6–21.4)
HGB BLDA-MCNC: 14.2 G/DL (ref 14–18)
HGB BLDA-MCNC: 14.3 G/DL (ref 14–18)
INHALED O2 CONCENTRATION: 21 %
INHALED O2 CONCENTRATION: 21 %
IPAP: 0
IPAP: 0
Lab: ABNORMAL
MODALITY: ABNORMAL
MODALITY: ABNORMAL
NOTE: ABNORMAL
NOTE: ABNORMAL
NOTIFIED BY: ABNORMAL
NOTIFIED WHO: ABNORMAL
PAW @ PEAK INSP FLOW SETTING VENT: 0 CMH2O
PAW @ PEAK INSP FLOW SETTING VENT: 0 CMH2O
PCO2 BLDCOA: 60.5 MMHG (ref 43.3–54.9)
PCO2 BLDCOV: 61.3 MM HG (ref 28–40)
PH BLDCOA: 7.23 PH UNITS (ref 7.22–7.3)
PH BLDCOV: 7.23 PH UNITS (ref 7.31–7.37)
PO2 BLDCOA: 5.6 MMHG (ref 11.5–43.3)
PO2 BLDCOV: 5.4 MM HG (ref 21–31)
SAO2 % BLDCOA: 5.9 %
SAO2 % BLDCOA: ABNORMAL %
SAO2 % BLDCOV: 5.5 %
TOTAL RATE: 0 BREATHS/MINUTE
TOTAL RATE: 0 BREATHS/MINUTE

## 2021-08-26 PROCEDURE — 25010000003 MAGNESIUM SULFATE 20 GM/500ML SOLUTION: Performed by: OBSTETRICS & GYNECOLOGY

## 2021-08-26 PROCEDURE — 25010000003 MORPHINE PER 10 MG: Performed by: NURSE ANESTHETIST, CERTIFIED REGISTERED

## 2021-08-26 PROCEDURE — 99024 POSTOP FOLLOW-UP VISIT: CPT | Performed by: OBSTETRICS & GYNECOLOGY

## 2021-08-26 PROCEDURE — 25010000002 ONDANSETRON PER 1 MG: Performed by: NURSE ANESTHETIST, CERTIFIED REGISTERED

## 2021-08-26 PROCEDURE — 59514 CESAREAN DELIVERY ONLY: CPT | Performed by: OBSTETRICS & GYNECOLOGY

## 2021-08-26 PROCEDURE — 25010000002 MIDAZOLAM PER 1 MG: Performed by: NURSE ANESTHETIST, CERTIFIED REGISTERED

## 2021-08-26 PROCEDURE — 25010000002 FENTANYL CITRATE (PF) 50 MCG/ML SOLUTION: Performed by: NURSE ANESTHETIST, CERTIFIED REGISTERED

## 2021-08-26 PROCEDURE — 82805 BLOOD GASES W/O2 SATURATION: CPT

## 2021-08-26 PROCEDURE — 25010000002 DIPHENHYDRAMINE PER 50 MG: Performed by: NURSE ANESTHETIST, CERTIFIED REGISTERED

## 2021-08-26 PROCEDURE — 25010000002 NALBUPHINE PER 10 MG: Performed by: NURSE ANESTHETIST, CERTIFIED REGISTERED

## 2021-08-26 PROCEDURE — 51702 INSERT TEMP BLADDER CATH: CPT

## 2021-08-26 RX ORDER — SODIUM CHLORIDE 0.9 % (FLUSH) 0.9 %
10 SYRINGE (ML) INJECTION AS NEEDED
Status: DISCONTINUED | OUTPATIENT
Start: 2021-08-26 | End: 2021-08-29 | Stop reason: HOSPADM

## 2021-08-26 RX ORDER — ONDANSETRON 2 MG/ML
INJECTION INTRAMUSCULAR; INTRAVENOUS AS NEEDED
Status: DISCONTINUED | OUTPATIENT
Start: 2021-08-26 | End: 2021-08-26 | Stop reason: SURG

## 2021-08-26 RX ORDER — OXYTOCIN-SODIUM CHLORIDE 0.9% IV SOLN 30 UNIT/500ML 30-0.9/5 UT/ML-%
85 SOLUTION INTRAVENOUS ONCE
Status: COMPLETED | OUTPATIENT
Start: 2021-08-26 | End: 2021-08-26

## 2021-08-26 RX ORDER — CLINDAMYCIN PHOSPHATE 900 MG/50ML
900 INJECTION INTRAVENOUS ONCE
Status: COMPLETED | OUTPATIENT
Start: 2021-08-26 | End: 2021-08-26

## 2021-08-26 RX ORDER — NALBUPHINE HCL 10 MG/ML
2 AMPUL (ML) INJECTION ONCE AS NEEDED
Status: COMPLETED | OUTPATIENT
Start: 2021-08-26 | End: 2021-08-26

## 2021-08-26 RX ORDER — MIDAZOLAM HYDROCHLORIDE 1 MG/ML
INJECTION INTRAMUSCULAR; INTRAVENOUS AS NEEDED
Status: DISCONTINUED | OUTPATIENT
Start: 2021-08-26 | End: 2021-08-26 | Stop reason: SURG

## 2021-08-26 RX ORDER — CALCIUM CARBONATE 200(500)MG
1 TABLET,CHEWABLE ORAL EVERY 4 HOURS PRN
Status: DISCONTINUED | OUTPATIENT
Start: 2021-08-26 | End: 2021-08-29 | Stop reason: HOSPADM

## 2021-08-26 RX ORDER — FAMOTIDINE 10 MG/ML
INJECTION, SOLUTION INTRAVENOUS AS NEEDED
Status: DISCONTINUED | OUTPATIENT
Start: 2021-08-26 | End: 2021-08-26 | Stop reason: SURG

## 2021-08-26 RX ORDER — FENTANYL CITRATE 50 UG/ML
INJECTION, SOLUTION INTRAMUSCULAR; INTRAVENOUS AS NEEDED
Status: DISCONTINUED | OUTPATIENT
Start: 2021-08-26 | End: 2021-08-26 | Stop reason: SURG

## 2021-08-26 RX ORDER — SIMETHICONE 80 MG
80 TABLET,CHEWABLE ORAL 4 TIMES DAILY PRN
Status: DISCONTINUED | OUTPATIENT
Start: 2021-08-26 | End: 2021-08-29 | Stop reason: HOSPADM

## 2021-08-26 RX ORDER — GUAIFENESIN 200 MG/1
400 TABLET ORAL EVERY 6 HOURS PRN
Status: DISCONTINUED | OUTPATIENT
Start: 2021-08-26 | End: 2021-08-29 | Stop reason: HOSPADM

## 2021-08-26 RX ORDER — TRISODIUM CITRATE DIHYDRATE AND CITRIC ACID MONOHYDRATE 500; 334 MG/5ML; MG/5ML
30 SOLUTION ORAL ONCE
Status: COMPLETED | OUTPATIENT
Start: 2021-08-26 | End: 2021-08-26

## 2021-08-26 RX ORDER — OXYTOCIN-SODIUM CHLORIDE 0.9% IV SOLN 30 UNIT/500ML 30-0.9/5 UT/ML-%
SOLUTION INTRAVENOUS CONTINUOUS PRN
Status: DISCONTINUED | OUTPATIENT
Start: 2021-08-26 | End: 2021-08-26 | Stop reason: SURG

## 2021-08-26 RX ORDER — OXYCODONE HYDROCHLORIDE 5 MG/1
5 TABLET ORAL EVERY 4 HOURS PRN
Status: DISCONTINUED | OUTPATIENT
Start: 2021-08-26 | End: 2021-08-29 | Stop reason: HOSPADM

## 2021-08-26 RX ORDER — KETOROLAC TROMETHAMINE 30 MG/ML
30 INJECTION, SOLUTION INTRAMUSCULAR; INTRAVENOUS ONCE
Status: DISCONTINUED | OUTPATIENT
Start: 2021-08-26 | End: 2021-08-26

## 2021-08-26 RX ORDER — BUPIVACAINE HYDROCHLORIDE 7.5 MG/ML
INJECTION, SOLUTION INTRASPINAL AS NEEDED
Status: DISCONTINUED | OUTPATIENT
Start: 2021-08-26 | End: 2021-08-26 | Stop reason: SURG

## 2021-08-26 RX ORDER — TRISODIUM CITRATE DIHYDRATE AND CITRIC ACID MONOHYDRATE 500; 334 MG/5ML; MG/5ML
SOLUTION ORAL
Status: COMPLETED
Start: 2021-08-26 | End: 2021-08-26

## 2021-08-26 RX ORDER — SODIUM CHLORIDE, SODIUM LACTATE, POTASSIUM CHLORIDE, CALCIUM CHLORIDE 600; 310; 30; 20 MG/100ML; MG/100ML; MG/100ML; MG/100ML
125 INJECTION, SOLUTION INTRAVENOUS CONTINUOUS
Status: DISCONTINUED | OUTPATIENT
Start: 2021-08-26 | End: 2021-08-27

## 2021-08-26 RX ORDER — FAMOTIDINE 10 MG/ML
20 INJECTION, SOLUTION INTRAVENOUS ONCE
Status: DISCONTINUED | OUTPATIENT
Start: 2021-08-26 | End: 2021-08-29 | Stop reason: HOSPADM

## 2021-08-26 RX ORDER — METOCLOPRAMIDE HYDROCHLORIDE 5 MG/ML
10 INJECTION INTRAMUSCULAR; INTRAVENOUS ONCE
Status: DISCONTINUED | OUTPATIENT
Start: 2021-08-26 | End: 2021-08-29 | Stop reason: HOSPADM

## 2021-08-26 RX ORDER — LANOLIN
CREAM (ML) TOPICAL
Status: DISCONTINUED | OUTPATIENT
Start: 2021-08-26 | End: 2021-08-29 | Stop reason: HOSPADM

## 2021-08-26 RX ORDER — MISOPROSTOL 200 UG/1
800 TABLET ORAL AS NEEDED
Status: DISCONTINUED | OUTPATIENT
Start: 2021-08-26 | End: 2021-08-29 | Stop reason: HOSPADM

## 2021-08-26 RX ORDER — PROMETHAZINE HYDROCHLORIDE 12.5 MG/1
12.5 SUPPOSITORY RECTAL ONCE AS NEEDED
Status: DISCONTINUED | OUTPATIENT
Start: 2021-08-26 | End: 2021-08-29 | Stop reason: HOSPADM

## 2021-08-26 RX ORDER — METHYLERGONOVINE MALEATE 0.2 MG/ML
200 INJECTION INTRAVENOUS ONCE AS NEEDED
Status: DISCONTINUED | OUTPATIENT
Start: 2021-08-26 | End: 2021-08-29 | Stop reason: HOSPADM

## 2021-08-26 RX ORDER — MAGNESIUM SULFATE HEPTAHYDRATE 40 MG/ML
2 INJECTION, SOLUTION INTRAVENOUS CONTINUOUS
Status: DISCONTINUED | OUTPATIENT
Start: 2021-08-26 | End: 2021-08-27

## 2021-08-26 RX ORDER — HYDROCORTISONE 25 MG/G
1 CREAM TOPICAL AS NEEDED
Status: DISCONTINUED | OUTPATIENT
Start: 2021-08-26 | End: 2021-08-29 | Stop reason: HOSPADM

## 2021-08-26 RX ORDER — PRENATAL VIT/IRON FUM/FOLIC AC 27MG-0.8MG
1 TABLET ORAL DAILY
Status: DISCONTINUED | OUTPATIENT
Start: 2021-08-26 | End: 2021-08-26 | Stop reason: SDUPTHER

## 2021-08-26 RX ORDER — OXYTOCIN-SODIUM CHLORIDE 0.9% IV SOLN 30 UNIT/500ML 30-0.9/5 UT/ML-%
650 SOLUTION INTRAVENOUS ONCE
Status: DISCONTINUED | OUTPATIENT
Start: 2021-08-26 | End: 2021-08-27

## 2021-08-26 RX ORDER — MORPHINE SULFATE 0.5 MG/ML
INJECTION, SOLUTION EPIDURAL; INTRATHECAL; INTRAVENOUS AS NEEDED
Status: DISCONTINUED | OUTPATIENT
Start: 2021-08-26 | End: 2021-08-26 | Stop reason: SURG

## 2021-08-26 RX ORDER — METOCLOPRAMIDE 10 MG/1
10 TABLET ORAL ONCE AS NEEDED
Status: DISCONTINUED | OUTPATIENT
Start: 2021-08-26 | End: 2021-08-29 | Stop reason: HOSPADM

## 2021-08-26 RX ORDER — PROMETHAZINE HYDROCHLORIDE 25 MG/1
25 TABLET ORAL ONCE AS NEEDED
Status: DISCONTINUED | OUTPATIENT
Start: 2021-08-26 | End: 2021-08-29 | Stop reason: HOSPADM

## 2021-08-26 RX ORDER — DIPHENHYDRAMINE HYDROCHLORIDE 50 MG/ML
25 INJECTION INTRAMUSCULAR; INTRAVENOUS EVERY 6 HOURS PRN
Status: DISCONTINUED | OUTPATIENT
Start: 2021-08-26 | End: 2021-08-29 | Stop reason: HOSPADM

## 2021-08-26 RX ORDER — CARBOPROST TROMETHAMINE 250 UG/ML
250 INJECTION, SOLUTION INTRAMUSCULAR AS NEEDED
Status: DISCONTINUED | OUTPATIENT
Start: 2021-08-26 | End: 2021-08-29 | Stop reason: HOSPADM

## 2021-08-26 RX ORDER — CETIRIZINE HYDROCHLORIDE 10 MG/1
10 TABLET ORAL DAILY
Status: DISCONTINUED | OUTPATIENT
Start: 2021-08-26 | End: 2021-08-29 | Stop reason: HOSPADM

## 2021-08-26 RX ORDER — EPHEDRINE SULFATE 50 MG/ML
INJECTION, SOLUTION INTRAVENOUS AS NEEDED
Status: DISCONTINUED | OUTPATIENT
Start: 2021-08-26 | End: 2021-08-26 | Stop reason: SURG

## 2021-08-26 RX ORDER — ALUMINA, MAGNESIA, AND SIMETHICONE 2400; 2400; 240 MG/30ML; MG/30ML; MG/30ML
15 SUSPENSION ORAL EVERY 4 HOURS PRN
Status: DISCONTINUED | OUTPATIENT
Start: 2021-08-26 | End: 2021-08-29 | Stop reason: HOSPADM

## 2021-08-26 RX ORDER — SODIUM CHLORIDE, SODIUM LACTATE, POTASSIUM CHLORIDE, CALCIUM CHLORIDE 600; 310; 30; 20 MG/100ML; MG/100ML; MG/100ML; MG/100ML
999 INJECTION, SOLUTION INTRAVENOUS CONTINUOUS
Status: DISCONTINUED | OUTPATIENT
Start: 2021-08-26 | End: 2021-08-26

## 2021-08-26 RX ORDER — NICOTINE 21 MG/24HR
1 PATCH, TRANSDERMAL 24 HOURS TRANSDERMAL EVERY 24 HOURS
Status: DISCONTINUED | OUTPATIENT
Start: 2021-08-26 | End: 2021-08-29 | Stop reason: HOSPADM

## 2021-08-26 RX ORDER — ONDANSETRON 2 MG/ML
4 INJECTION INTRAMUSCULAR; INTRAVENOUS EVERY 6 HOURS PRN
Status: DISCONTINUED | OUTPATIENT
Start: 2021-08-26 | End: 2021-08-29 | Stop reason: HOSPADM

## 2021-08-26 RX ORDER — ONDANSETRON 4 MG/1
4 TABLET, FILM COATED ORAL EVERY 6 HOURS PRN
Status: DISCONTINUED | OUTPATIENT
Start: 2021-08-26 | End: 2021-08-29 | Stop reason: HOSPADM

## 2021-08-26 RX ORDER — OXYCODONE HYDROCHLORIDE 5 MG/1
10 TABLET ORAL EVERY 4 HOURS PRN
Status: DISCONTINUED | OUTPATIENT
Start: 2021-08-26 | End: 2021-08-29 | Stop reason: HOSPADM

## 2021-08-26 RX ORDER — SODIUM CHLORIDE 0.9 % (FLUSH) 0.9 %
3 SYRINGE (ML) INJECTION EVERY 12 HOURS SCHEDULED
Status: DISCONTINUED | OUTPATIENT
Start: 2021-08-26 | End: 2021-08-29 | Stop reason: HOSPADM

## 2021-08-26 RX ORDER — ACETAMINOPHEN 500 MG
1000 TABLET ORAL ONCE
Status: COMPLETED | OUTPATIENT
Start: 2021-08-26 | End: 2021-08-26

## 2021-08-26 RX ORDER — DOCUSATE SODIUM 100 MG/1
100 CAPSULE, LIQUID FILLED ORAL 2 TIMES DAILY PRN
Status: DISCONTINUED | OUTPATIENT
Start: 2021-08-26 | End: 2021-08-29 | Stop reason: HOSPADM

## 2021-08-26 RX ORDER — LIDOCAINE HYDROCHLORIDE 10 MG/ML
5 INJECTION, SOLUTION EPIDURAL; INFILTRATION; INTRACAUDAL; PERINEURAL AS NEEDED
Status: DISCONTINUED | OUTPATIENT
Start: 2021-08-26 | End: 2021-08-29 | Stop reason: HOSPADM

## 2021-08-26 RX ADMIN — ONDANSETRON 4 MG: 2 INJECTION INTRAMUSCULAR; INTRAVENOUS at 07:12

## 2021-08-26 RX ADMIN — EPHEDRINE SULFATE 10 MG: 50 INJECTION, SOLUTION INTRAVENOUS at 07:39

## 2021-08-26 RX ADMIN — MIDAZOLAM 1 MG: 1 INJECTION INTRAMUSCULAR; INTRAVENOUS at 07:38

## 2021-08-26 RX ADMIN — SODIUM CHLORIDE, POTASSIUM CHLORIDE, SODIUM LACTATE AND CALCIUM CHLORIDE 125 ML/HR: 600; 310; 30; 20 INJECTION, SOLUTION INTRAVENOUS at 08:29

## 2021-08-26 RX ADMIN — DIPHENHYDRAMINE HYDROCHLORIDE 25 MG: 50 INJECTION INTRAMUSCULAR; INTRAVENOUS at 08:55

## 2021-08-26 RX ADMIN — EPHEDRINE SULFATE 10 MG: 50 INJECTION, SOLUTION INTRAVENOUS at 07:27

## 2021-08-26 RX ADMIN — OXYCODONE 10 MG: 5 TABLET ORAL at 16:11

## 2021-08-26 RX ADMIN — MAGNESIUM SULFATE HEPTAHYDRATE 2 G/HR: 40 INJECTION, SOLUTION INTRAVENOUS at 18:24

## 2021-08-26 RX ADMIN — NALBUPHINE HYDROCHLORIDE 2 MG: 10 INJECTION, SOLUTION INTRAMUSCULAR; INTRAVENOUS; SUBCUTANEOUS at 09:33

## 2021-08-26 RX ADMIN — DIPHENHYDRAMINE HYDROCHLORIDE 25 MG: 50 INJECTION INTRAMUSCULAR; INTRAVENOUS at 16:11

## 2021-08-26 RX ADMIN — LABETALOL 20 MG/4 ML (5 MG/ML) INTRAVENOUS SYRINGE 20 MG: at 06:22

## 2021-08-26 RX ADMIN — LABETALOL 20 MG/4 ML (5 MG/ML) INTRAVENOUS SYRINGE 40 MG: at 06:34

## 2021-08-26 RX ADMIN — DEXTROSE AND SODIUM CHLORIDE 75 ML/HR: 5; 200 INJECTION, SOLUTION INTRAVENOUS at 04:47

## 2021-08-26 RX ADMIN — GUAIFENESIN 400 MG: 200 TABLET ORAL at 16:11

## 2021-08-26 RX ADMIN — SODIUM CHLORIDE, POTASSIUM CHLORIDE, SODIUM LACTATE AND CALCIUM CHLORIDE 999 ML/HR: 600; 310; 30; 20 INJECTION, SOLUTION INTRAVENOUS at 06:50

## 2021-08-26 RX ADMIN — OXYCODONE 10 MG: 5 TABLET ORAL at 23:37

## 2021-08-26 RX ADMIN — EPHEDRINE SULFATE 10 MG: 50 INJECTION, SOLUTION INTRAVENOUS at 08:11

## 2021-08-26 RX ADMIN — LABETALOL HYDROCHLORIDE 200 MG: 200 TABLET, FILM COATED ORAL at 21:37

## 2021-08-26 RX ADMIN — SODIUM CITRATE AND CITRIC ACID MONOHYDRATE 30 ML: 500; 334 SOLUTION ORAL at 06:48

## 2021-08-26 RX ADMIN — OXYCODONE 10 MG: 5 TABLET ORAL at 10:44

## 2021-08-26 RX ADMIN — OXYTOCIN 900 ML/HR: 10 INJECTION INTRAVENOUS at 07:38

## 2021-08-26 RX ADMIN — FENTANYL CITRATE 20 MCG: 50 INJECTION, SOLUTION INTRAMUSCULAR; INTRAVENOUS at 07:19

## 2021-08-26 RX ADMIN — ACETAMINOPHEN 650 MG: 325 TABLET, FILM COATED ORAL at 04:51

## 2021-08-26 RX ADMIN — MIDAZOLAM 1 MG: 1 INJECTION INTRAMUSCULAR; INTRAVENOUS at 07:12

## 2021-08-26 RX ADMIN — OXYTOCIN 85 ML/HR: 10 INJECTION INTRAVENOUS at 08:30

## 2021-08-26 RX ADMIN — FAMOTIDINE 20 MG: 10 INJECTION, SOLUTION INTRAVENOUS at 07:12

## 2021-08-26 RX ADMIN — LABETALOL HYDROCHLORIDE 200 MG: 200 TABLET, FILM COATED ORAL at 06:22

## 2021-08-26 RX ADMIN — TRISODIUM CITRATE DIHYDRATE AND CITRIC ACID MONOHYDRATE 30 ML: 500; 334 SOLUTION ORAL at 06:48

## 2021-08-26 RX ADMIN — CLINDAMYCIN PHOSPHATE 900 MG: 900 INJECTION, SOLUTION INTRAVENOUS at 06:51

## 2021-08-26 RX ADMIN — LABETALOL HYDROCHLORIDE 200 MG: 200 TABLET, FILM COATED ORAL at 18:14

## 2021-08-26 RX ADMIN — CETIRIZINE HYDROCHLORIDE 10 MG: 10 TABLET, FILM COATED ORAL at 16:11

## 2021-08-26 RX ADMIN — ACETAMINOPHEN 1000 MG: 500 TABLET, FILM COATED ORAL at 12:04

## 2021-08-26 RX ADMIN — BUPIVACAINE HYDROCHLORIDE IN DEXTROSE 1.8 ML: 7.5 INJECTION, SOLUTION SUBARACHNOID at 07:19

## 2021-08-26 RX ADMIN — MAGNESIUM SULFATE HEPTAHYDRATE 2 G/HR: 40 INJECTION, SOLUTION INTRAVENOUS at 03:20

## 2021-08-26 RX ADMIN — MORPHINE SULFATE 0.15 MG: 0.5 INJECTION, SOLUTION EPIDURAL; INTRATHECAL; INTRAVENOUS at 07:19

## 2021-08-26 NOTE — ANESTHESIA PREPROCEDURE EVALUATION
Anesthesia Evaluation     Patient summary reviewed and Nursing notes reviewed   NPO Solid Status: Waived due to emergency  NPO Liquid Status: Waived due to emergency           Airway   Dental      Pulmonary    (+) a smoker Current,   Cardiovascular     (+) hypertension poorly controlled,       Neuro/Psych  (+) headaches, psychiatric history Anxiety,     GI/Hepatic/Renal/Endo    (+)  hiatal hernia, GERD poorly controlled,  hepatitis C, liver disease history of elevated LFT,     Musculoskeletal     Abdominal    Substance History   (+) drug use     OB/GYN    (+) Pregnant,         Other   arthritis,                    Anesthesia Plan    ASA 3 - emergent     spinal and ITN       Anesthetic plan, all risks, benefits, and alternatives have been provided, discussed and informed consent has been obtained with: patient.    Plan discussed with CRNA and attending.

## 2021-08-26 NOTE — ANESTHESIA PROCEDURE NOTES
Spinal Block      Patient reassessed immediately prior to procedure    Indication:procedure for pain  Performed By  CRNA: Kimberly Yuan CRNA  Preanesthetic Checklist  Completed: patient identified, IV checked, risks and benefits discussed, surgical consent, monitors and equipment checked, pre-op evaluation and timeout performed  Spinal Block Prep:  Patient Position:sitting  Sterile Tech:cap, gloves, mask and sterile barriers  Prep:Betadine  Patient Monitoring:blood pressure monitoring, continuous pulse oximetry and EKG  Spinal Block Procedure  Approach:midline  Guidance:landmark technique and palpation technique  Location:L3-L4  Needle Type:Rocky  Needle Gauge:25 G  Placement of Spinal needle event:cerebrospinal fluid aspirated  Paresthesia: no  Fluid Appearance:clear     Post Assessment  Patient Tolerance:patient tolerated the procedure well with no apparent complications  Complications no

## 2021-08-26 NOTE — ANESTHESIA POSTPROCEDURE EVALUATION
Patient: Melissa Hinton    Procedure Summary     Date: 21 Room / Location: [facility] LABOR DELIVERY   [facility]IV[facility]    Anesthesia Start: 711 Anesthesia Stop: 806    Procedure:  SECTION PRIMARY (N/A Abdomen) Diagnosis:     Surgeons: Omar Gallegos MD Provider: Pipo Rene DO    Anesthesia Type: spinal, ITN ASA Status: 3 - Emergent          Anesthesia Type: spinal, ITN    Vitals  Vitals Value Taken Time   BP 86/48    Temp 97.5 °F (36.4 °C) 21 0811   Pulse 75 21 0814   Resp 16    SpO2 95 % 21 0814   Vitals shown include unvalidated device data.          Post Anesthesia Care and Evaluation    Patient location during evaluation: bedside  Patient participation: complete - patient participated  Level of consciousness: awake and alert  Pain score: 0  Pain management: adequate  Airway patency: patent  Anesthetic complications: No anesthetic complications    Cardiovascular status: acceptable  Respiratory status: acceptable  Hydration status: acceptable

## 2021-08-27 LAB
ALP SERPL-CCNC: 115 U/L (ref 39–117)
ALT SERPL W P-5'-P-CCNC: 8 U/L (ref 1–33)
AST SERPL-CCNC: 14 U/L (ref 1–32)
BASOPHILS # BLD AUTO: 0.02 10*3/MM3 (ref 0–0.2)
BASOPHILS NFR BLD AUTO: 0.2 % (ref 0–1.5)
BILIRUB SERPL-MCNC: <0.2 MG/DL (ref 0–1.2)
CREAT SERPL-MCNC: 0.5 MG/DL (ref 0.57–1)
DEPRECATED RDW RBC AUTO: 44.8 FL (ref 37–54)
EOSINOPHIL # BLD AUTO: 0.05 10*3/MM3 (ref 0–0.4)
EOSINOPHIL NFR BLD AUTO: 0.5 % (ref 0.3–6.2)
ERYTHROCYTE [DISTWIDTH] IN BLOOD BY AUTOMATED COUNT: 13.5 % (ref 12.3–15.4)
HCT VFR BLD AUTO: 33.2 % (ref 34–46.6)
HGB BLD-MCNC: 10.9 G/DL (ref 12–15.9)
IMM GRANULOCYTES # BLD AUTO: 0.06 10*3/MM3 (ref 0–0.05)
IMM GRANULOCYTES NFR BLD AUTO: 0.6 % (ref 0–0.5)
LDH SERPL-CCNC: 231 U/L (ref 135–214)
LYMPHOCYTES # BLD AUTO: 0.92 10*3/MM3 (ref 0.7–3.1)
LYMPHOCYTES NFR BLD AUTO: 9.7 % (ref 19.6–45.3)
MCH RBC QN AUTO: 30.3 PG (ref 26.6–33)
MCHC RBC AUTO-ENTMCNC: 32.8 G/DL (ref 31.5–35.7)
MCV RBC AUTO: 92.2 FL (ref 79–97)
MONOCYTES # BLD AUTO: 0.66 10*3/MM3 (ref 0.1–0.9)
MONOCYTES NFR BLD AUTO: 7 % (ref 5–12)
NEUTROPHILS NFR BLD AUTO: 7.75 10*3/MM3 (ref 1.7–7)
NEUTROPHILS NFR BLD AUTO: 82 % (ref 42.7–76)
NRBC BLD AUTO-RTO: 0 /100 WBC (ref 0–0.2)
PLATELET # BLD AUTO: 209 10*3/MM3 (ref 140–450)
PMV BLD AUTO: 12.3 FL (ref 6–12)
RBC # BLD AUTO: 3.6 10*6/MM3 (ref 3.77–5.28)
URATE SERPL-MCNC: 5.6 MG/DL (ref 2.4–5.7)
WBC # BLD AUTO: 9.46 10*3/MM3 (ref 3.4–10.8)

## 2021-08-27 PROCEDURE — 84450 TRANSFERASE (AST) (SGOT): CPT | Performed by: OBSTETRICS & GYNECOLOGY

## 2021-08-27 PROCEDURE — 83615 LACTATE (LD) (LDH) ENZYME: CPT | Performed by: OBSTETRICS & GYNECOLOGY

## 2021-08-27 PROCEDURE — 84460 ALANINE AMINO (ALT) (SGPT): CPT | Performed by: OBSTETRICS & GYNECOLOGY

## 2021-08-27 PROCEDURE — 99232 SBSQ HOSP IP/OBS MODERATE 35: CPT | Performed by: OBSTETRICS & GYNECOLOGY

## 2021-08-27 PROCEDURE — 85025 COMPLETE CBC W/AUTO DIFF WBC: CPT | Performed by: OBSTETRICS & GYNECOLOGY

## 2021-08-27 PROCEDURE — 82565 ASSAY OF CREATININE: CPT | Performed by: OBSTETRICS & GYNECOLOGY

## 2021-08-27 PROCEDURE — 82247 BILIRUBIN TOTAL: CPT | Performed by: OBSTETRICS & GYNECOLOGY

## 2021-08-27 PROCEDURE — 84075 ASSAY ALKALINE PHOSPHATASE: CPT | Performed by: OBSTETRICS & GYNECOLOGY

## 2021-08-27 PROCEDURE — 88307 TISSUE EXAM BY PATHOLOGIST: CPT | Performed by: OBSTETRICS & GYNECOLOGY

## 2021-08-27 PROCEDURE — 84550 ASSAY OF BLOOD/URIC ACID: CPT | Performed by: OBSTETRICS & GYNECOLOGY

## 2021-08-27 RX ORDER — NIFEDIPINE 10 MG/1
10 CAPSULE ORAL ONCE
Status: COMPLETED | OUTPATIENT
Start: 2021-08-27 | End: 2021-08-27

## 2021-08-27 RX ORDER — BUTALBITAL, ACETAMINOPHEN AND CAFFEINE 50; 325; 40 MG/1; MG/1; MG/1
1 TABLET ORAL EVERY 4 HOURS PRN
Status: DISCONTINUED | OUTPATIENT
Start: 2021-08-27 | End: 2021-08-29 | Stop reason: HOSPADM

## 2021-08-27 RX ORDER — LABETALOL 200 MG/1
300 TABLET, FILM COATED ORAL EVERY 8 HOURS SCHEDULED
Status: DISCONTINUED | OUTPATIENT
Start: 2021-08-27 | End: 2021-08-28

## 2021-08-27 RX ADMIN — ACETAMINOPHEN 650 MG: 325 TABLET, FILM COATED ORAL at 05:34

## 2021-08-27 RX ADMIN — LABETALOL HYDROCHLORIDE 200 MG: 200 TABLET, FILM COATED ORAL at 05:23

## 2021-08-27 RX ADMIN — CETIRIZINE HYDROCHLORIDE 10 MG: 10 TABLET, FILM COATED ORAL at 12:34

## 2021-08-27 RX ADMIN — LABETALOL HYDROCHLORIDE 200 MG: 200 TABLET, FILM COATED ORAL at 14:12

## 2021-08-27 RX ADMIN — NIFEDIPINE 10 MG: 10 CAPSULE ORAL at 05:34

## 2021-08-27 RX ADMIN — BUTALBITAL, ACETAMINOPHEN, AND CAFFEINE 1 TABLET: 50; 325; 40 TABLET ORAL at 11:20

## 2021-08-27 RX ADMIN — SODIUM CHLORIDE, PRESERVATIVE FREE 3 ML: 5 INJECTION INTRAVENOUS at 20:34

## 2021-08-27 RX ADMIN — OXYCODONE 10 MG: 5 TABLET ORAL at 12:34

## 2021-08-27 RX ADMIN — OXYCODONE 10 MG: 5 TABLET ORAL at 07:51

## 2021-08-27 RX ADMIN — LABETALOL HYDROCHLORIDE 300 MG: 200 TABLET, FILM COATED ORAL at 22:30

## 2021-08-27 RX ADMIN — OXYCODONE 10 MG: 5 TABLET ORAL at 20:35

## 2021-08-27 RX ADMIN — OXYCODONE 10 MG: 5 TABLET ORAL at 16:27

## 2021-08-27 RX ADMIN — DOCUSATE SODIUM 100 MG: 100 CAPSULE, LIQUID FILLED ORAL at 07:51

## 2021-08-27 NOTE — ANESTHESIA POSTPROCEDURE EVALUATION
Patient: Melissa Hinton    Procedure Summary     Date: 21 Room / Location: [facility] LABOR DELIVERY   [facility]IV[facility]    Anesthesia Start: 711 Anesthesia Stop: 806    Procedure:  SECTION PRIMARY (N/A Abdomen) Diagnosis:     Surgeons: Omar Gallegos MD Provider: Pipo Rene DO    Anesthesia Type: spinal, ITN ASA Status: 3 - Emergent          Anesthesia Type: spinal, ITN    Vitals  Vitals Value Taken Time   /86 21 0934   Temp 97.6 °F (36.4 °C) 21 0745   Pulse 77 21 0934   Resp 16 21 0745   SpO2 97 % 21 0745           Post Anesthesia Care and Evaluation    Patient location during evaluation: bedside  Patient participation: complete - patient participated  Level of consciousness: awake and alert  Pain management: adequate  Airway patency: patent  Anesthetic complications: No anesthetic complications    Cardiovascular status: acceptable  Respiratory status: acceptable  Hydration status: acceptable  Post Neuraxial Block status: Motor and sensory function returned to baseline and No signs or symptoms of PDPH

## 2021-08-28 PROCEDURE — 99232 SBSQ HOSP IP/OBS MODERATE 35: CPT | Performed by: NURSE PRACTITIONER

## 2021-08-28 RX ORDER — HYDROXYZINE HYDROCHLORIDE 10 MG/1
10 TABLET, FILM COATED ORAL 3 TIMES DAILY PRN
Status: DISCONTINUED | OUTPATIENT
Start: 2021-08-28 | End: 2021-08-29 | Stop reason: HOSPADM

## 2021-08-28 RX ORDER — NIFEDIPINE 10 MG/1
10 CAPSULE ORAL ONCE
Status: COMPLETED | OUTPATIENT
Start: 2021-08-28 | End: 2021-08-28

## 2021-08-28 RX ORDER — ACETAMINOPHEN 325 MG/1
650 TABLET ORAL EVERY 4 HOURS
Status: DISCONTINUED | OUTPATIENT
Start: 2021-08-28 | End: 2021-08-29 | Stop reason: HOSPADM

## 2021-08-28 RX ORDER — LABETALOL 200 MG/1
400 TABLET, FILM COATED ORAL 3 TIMES DAILY
Status: DISCONTINUED | OUTPATIENT
Start: 2021-08-28 | End: 2021-08-28

## 2021-08-28 RX ORDER — LABETALOL 200 MG/1
400 TABLET, FILM COATED ORAL 3 TIMES DAILY
Status: DISCONTINUED | OUTPATIENT
Start: 2021-08-28 | End: 2021-08-29 | Stop reason: HOSPADM

## 2021-08-28 RX ADMIN — ACETAMINOPHEN 650 MG: 325 TABLET, FILM COATED ORAL at 16:34

## 2021-08-28 RX ADMIN — OXYCODONE 10 MG: 5 TABLET ORAL at 23:21

## 2021-08-28 RX ADMIN — SIMETHICONE 80 MG: 80 TABLET, CHEWABLE ORAL at 08:47

## 2021-08-28 RX ADMIN — CETIRIZINE HYDROCHLORIDE 10 MG: 10 TABLET, FILM COATED ORAL at 08:47

## 2021-08-28 RX ADMIN — ACETAMINOPHEN 650 MG: 325 TABLET, FILM COATED ORAL at 21:19

## 2021-08-28 RX ADMIN — LABETALOL HYDROCHLORIDE 400 MG: 200 TABLET, FILM COATED ORAL at 16:33

## 2021-08-28 RX ADMIN — OXYCODONE 10 MG: 5 TABLET ORAL at 00:51

## 2021-08-28 RX ADMIN — ACETAMINOPHEN 650 MG: 325 TABLET, FILM COATED ORAL at 08:51

## 2021-08-28 RX ADMIN — OXYCODONE 10 MG: 5 TABLET ORAL at 15:11

## 2021-08-28 RX ADMIN — HYDROXYZINE HYDROCHLORIDE 10 MG: 10 TABLET, FILM COATED ORAL at 23:21

## 2021-08-28 RX ADMIN — OXYCODONE 10 MG: 5 TABLET ORAL at 06:07

## 2021-08-28 RX ADMIN — SODIUM CHLORIDE, PRESERVATIVE FREE 3 ML: 5 INJECTION INTRAVENOUS at 21:21

## 2021-08-28 RX ADMIN — NIFEDIPINE 10 MG: 10 CAPSULE ORAL at 10:07

## 2021-08-28 RX ADMIN — OXYCODONE 10 MG: 5 TABLET ORAL at 10:07

## 2021-08-28 RX ADMIN — LABETALOL HYDROCHLORIDE 400 MG: 200 TABLET, FILM COATED ORAL at 12:32

## 2021-08-28 RX ADMIN — OXYCODONE 10 MG: 5 TABLET ORAL at 19:16

## 2021-08-28 RX ADMIN — LABETALOL HYDROCHLORIDE 300 MG: 200 TABLET, FILM COATED ORAL at 06:09

## 2021-08-28 RX ADMIN — DOCUSATE SODIUM 100 MG: 100 CAPSULE, LIQUID FILLED ORAL at 15:11

## 2021-08-28 RX ADMIN — SODIUM CHLORIDE, PRESERVATIVE FREE 3 ML: 5 INJECTION INTRAVENOUS at 08:48

## 2021-08-29 VITALS
HEIGHT: 68 IN | RESPIRATION RATE: 16 BRPM | BODY MASS INDEX: 30.92 KG/M2 | OXYGEN SATURATION: 99 % | TEMPERATURE: 98.5 F | HEART RATE: 80 BPM | WEIGHT: 204 LBS | DIASTOLIC BLOOD PRESSURE: 80 MMHG | SYSTOLIC BLOOD PRESSURE: 166 MMHG

## 2021-08-29 PROCEDURE — 99238 HOSP IP/OBS DSCHRG MGMT 30/<: CPT | Performed by: OBSTETRICS & GYNECOLOGY

## 2021-08-29 RX ORDER — IBUPROFEN 600 MG/1
600 TABLET ORAL EVERY 6 HOURS PRN
Qty: 30 TABLET | Refills: 1 | Status: SHIPPED | OUTPATIENT
Start: 2021-08-29 | End: 2022-04-07

## 2021-08-29 RX ORDER — LABETALOL 200 MG/1
400 TABLET, FILM COATED ORAL 3 TIMES DAILY
Qty: 90 TABLET | Refills: 1 | Status: SHIPPED | OUTPATIENT
Start: 2021-08-29 | End: 2022-04-07

## 2021-08-29 RX ADMIN — SODIUM CHLORIDE, PRESERVATIVE FREE 3 ML: 5 INJECTION INTRAVENOUS at 07:57

## 2021-08-29 RX ADMIN — CETIRIZINE HYDROCHLORIDE 10 MG: 10 TABLET, FILM COATED ORAL at 07:55

## 2021-08-29 RX ADMIN — LABETALOL HYDROCHLORIDE 400 MG: 200 TABLET, FILM COATED ORAL at 07:56

## 2021-08-29 RX ADMIN — OXYCODONE 10 MG: 5 TABLET ORAL at 07:56

## 2021-08-29 RX ADMIN — ACETAMINOPHEN 650 MG: 325 TABLET, FILM COATED ORAL at 07:56

## 2021-08-29 RX ADMIN — DOCUSATE SODIUM 100 MG: 100 CAPSULE, LIQUID FILLED ORAL at 07:56

## 2021-08-29 RX ADMIN — OXYCODONE 10 MG: 5 TABLET ORAL at 12:01

## 2021-08-30 ENCOUNTER — TELEPHONE (OUTPATIENT)
Dept: OBSTETRICS AND GYNECOLOGY | Facility: CLINIC | Age: 41
End: 2021-08-30

## 2021-08-30 DIAGNOSIS — Z98.891 STATUS POST CESAREAN SECTION: Primary | ICD-10-CM

## 2021-08-30 DIAGNOSIS — G43.809 OTHER MIGRAINE WITHOUT STATUS MIGRAINOSUS, NOT INTRACTABLE: ICD-10-CM

## 2021-08-30 LAB
CYTO UR: NORMAL
LAB AP CASE REPORT: NORMAL
LAB AP CLINICAL INFORMATION: NORMAL
PATH REPORT.FINAL DX SPEC: NORMAL
PATH REPORT.GROSS SPEC: NORMAL

## 2021-08-30 RX ORDER — BUTALBITAL, ACETAMINOPHEN AND CAFFEINE 300; 40; 50 MG/1; MG/1; MG/1
1 CAPSULE ORAL EVERY 6 HOURS PRN
Qty: 20 CAPSULE | Refills: 0 | Status: SHIPPED | OUTPATIENT
Start: 2021-08-30

## 2021-08-30 RX ORDER — GABAPENTIN 300 MG/1
300 CAPSULE ORAL 3 TIMES DAILY
Qty: 30 CAPSULE | Refills: 0 | Status: SHIPPED | OUTPATIENT
Start: 2021-08-30 | End: 2021-09-28 | Stop reason: SDUPTHER
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
## 2021-08-30 NOTE — TELEPHONE ENCOUNTER
She could have stayed until Monday due to  section so not sure why she didn't go home with pain medications over the weekend.  Will send in gabapentin as she needs pain control. Please find out who discharged her or if she left AMA and if there was an Rx for pain medications. Also, find out how much gabapentin she is used to taking and I'll get it sent in.

## 2021-09-24 ENCOUNTER — TELEPHONE (OUTPATIENT)
Dept: OBSTETRICS AND GYNECOLOGY | Facility: CLINIC | Age: 41
End: 2021-09-24
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
## 2021-09-24 NOTE — TELEPHONE ENCOUNTER
Patient called and left voicemail.  Reports on message that she has been bleeding for several weeks, is very dark in color.  Also reported that she thought she was supposed to see us here 2 weeks after surgery and it is now 4 weeks, was told that someone would call her to schedule that appointment for her right after the surgery.

## 2021-09-28 ENCOUNTER — POSTPARTUM VISIT (OUTPATIENT)
Dept: OBSTETRICS AND GYNECOLOGY | Facility: CLINIC | Age: 41
End: 2021-09-28

## 2021-09-28 VITALS
SYSTOLIC BLOOD PRESSURE: 182 MMHG | HEIGHT: 68 IN | WEIGHT: 183.2 LBS | BODY MASS INDEX: 27.77 KG/M2 | DIASTOLIC BLOOD PRESSURE: 102 MMHG

## 2021-09-28 DIAGNOSIS — O13.9 GESTATIONAL HYPERTENSION, ANTEPARTUM: ICD-10-CM

## 2021-09-28 DIAGNOSIS — F41.9 ANXIETY: ICD-10-CM

## 2021-09-28 DIAGNOSIS — M79.7 FIBROMYALGIA: Primary | ICD-10-CM

## 2021-09-28 DIAGNOSIS — Z98.891 STATUS POST CESAREAN SECTION: ICD-10-CM

## 2021-09-28 PROCEDURE — 99214 OFFICE O/P EST MOD 30 MIN: CPT | Performed by: OBSTETRICS & GYNECOLOGY

## 2021-09-28 RX ORDER — GABAPENTIN 300 MG/1
300 CAPSULE ORAL 2 TIMES DAILY PRN
Qty: 30 CAPSULE | Refills: 0 | Status: SHIPPED | OUTPATIENT
Start: 2021-09-28 | End: 2021-10-20 | Stop reason: SDUPTHER

## 2021-09-28 RX ORDER — PRENATAL VIT/IRON FUM/FOLIC AC 27MG-0.8MG
TABLET ORAL
COMMUNITY
Start: 2021-07-22

## 2021-09-28 RX ORDER — HYDROXYZINE PAMOATE 50 MG/1
CAPSULE ORAL
COMMUNITY
Start: 2021-08-30 | End: 2021-09-28 | Stop reason: SDUPTHER

## 2021-09-28 RX ORDER — HYDROXYZINE PAMOATE 50 MG/1
50 CAPSULE ORAL 3 TIMES DAILY PRN
Qty: 90 CAPSULE | Refills: 1 | Status: SHIPPED | OUTPATIENT
Start: 2021-09-28 | End: 2021-12-01

## 2021-09-28 RX ORDER — CYCLOBENZAPRINE HCL 10 MG
10 TABLET ORAL 3 TIMES DAILY PRN
Qty: 90 TABLET | Refills: 0 | Status: SHIPPED | OUTPATIENT
Start: 2021-09-28 | End: 2021-10-20 | Stop reason: SDUPTHER

## 2021-09-28 RX ORDER — NIFEDIPINE 30 MG/1
30 TABLET, EXTENDED RELEASE ORAL DAILY
Qty: 30 TABLET | Refills: 1 | Status: SHIPPED | OUTPATIENT
Start: 2021-09-28 | End: 2021-10-20 | Stop reason: SDUPTHER

## 2021-09-28 NOTE — PROGRESS NOTES
"5 week postpartum visit      Melissa Hinton is a 41 y.o.  s/p  at 34.2 weeks on 2021, who presents today for a 5 week postpartum check.      Patient reports some postpartum depression.  She declines medications for depression but would like to continue Hydroxyzine for anxiety.     Patient describes bleeding as moderate.  Patient is bottle feeding.  Desires contraceptive methods: Vasectomy  for contraception.  Denies bowel or bladder issues.    She is still having chronic pain and has done well with gabapentin.  Rx refill given today however, I explained that she will need to establish care with a PCP to continue this medication long term.     Her BP is elevated today and she denies other complaints. We discussed switching to procardia and instructions given today.     PHYSICAL EXAM:    BP (!) 182/102   Ht 172.7 cm (67.99\")   Wt 83.1 kg (183 lb 3.2 oz)   LMP 2020 (Exact Date)   Breastfeeding No   BMI 27.86 kg/m²   Abdomen: +BS, benign, no masses, soft, non-tender.  Incision: yes Well-healed, clean, dry, intact.  Bimanual exam: External genitalia appear normal.  Vagina pink, moist, rugated, laceration healed.  Uterus non-tender.  No palpable masses in adnexa.   Extremities: No deep calf tenderness.  Postpartum Depression Screening Questionnaire: completed,  treatment indicated.  Baby Name: Nahomi    IMPRESSION/PLAN:  41 y.o.  s/p , 6 weeks postpartum.  Doing well.    - Recovered nicely from her delivery  - Continue gabapentin for fibromyalgia along with flexeril as needed.   - BP elevated today and will switch to procardia  - Depression is stable and declines additional medications.   - Return to normal physical activity  - Contraception: contraceptive methods: Vasectomy   - RTC in 2 weeks for BP check.     Omar Gallegos MD  2021    "

## 2021-10-20 ENCOUNTER — POSTPARTUM VISIT (OUTPATIENT)
Dept: OBSTETRICS AND GYNECOLOGY | Facility: CLINIC | Age: 41
End: 2021-10-20

## 2021-10-20 VITALS — SYSTOLIC BLOOD PRESSURE: 134 MMHG | DIASTOLIC BLOOD PRESSURE: 90 MMHG

## 2021-10-20 DIAGNOSIS — M79.7 FIBROMYALGIA: ICD-10-CM

## 2021-10-20 DIAGNOSIS — Z01.419 PAP TEST, AS PART OF ROUTINE GYNECOLOGICAL EXAMINATION: Primary | ICD-10-CM

## 2021-10-20 DIAGNOSIS — Z98.891 STATUS POST CESAREAN SECTION: ICD-10-CM

## 2021-10-20 PROCEDURE — 99214 OFFICE O/P EST MOD 30 MIN: CPT | Performed by: OBSTETRICS & GYNECOLOGY

## 2021-10-20 RX ORDER — CYCLOBENZAPRINE HCL 10 MG
10 TABLET ORAL 3 TIMES DAILY PRN
Qty: 90 TABLET | Refills: 0 | Status: SHIPPED | OUTPATIENT
Start: 2021-10-20 | End: 2021-12-01

## 2021-10-20 RX ORDER — GABAPENTIN 300 MG/1
300 CAPSULE ORAL NIGHTLY PRN
Qty: 30 CAPSULE | Refills: 0 | Status: SHIPPED | OUTPATIENT
Start: 2021-10-20 | End: 2022-04-07 | Stop reason: SDUPTHER

## 2021-10-20 RX ORDER — NIFEDIPINE 30 MG/1
30 TABLET, EXTENDED RELEASE ORAL DAILY
Qty: 30 TABLET | Refills: 1 | Status: SHIPPED | OUTPATIENT
Start: 2021-10-20 | End: 2022-04-07 | Stop reason: SDUPTHER

## 2021-10-21 NOTE — PROGRESS NOTES
8 week postpartum visit      Melissa Hinton is a 41 y.o.  s/p  at 8 weeks ago, who presents today for a 6 week postpartum check.      Patient has postpartum depression and declines treatment.  Patient describes bleeding as light.  Patient is bottle feeding.  Desires contraceptive methods: Vasectomy  for contraception.  Denies bowel or bladder issues.    She has hypertension and doing well on procardia. She also has fibromyalgia and has been treated with neurontin and flexeril. Refills given today, but I encouraged her to establish care with a PCP due to these conditions.     Review of Systems   Constitutional: Negative for activity change, appetite change, chills, diaphoresis, fatigue, fever and unexpected weight change.   Eyes: Negative for visual disturbance.   Respiratory: Negative for cough and shortness of breath.    Cardiovascular: Negative for chest pain and palpitations.   Gastrointestinal: Negative for abdominal distention, abdominal pain, nausea and vomiting.   Genitourinary: Negative for menstrual problem and pelvic pain.   Musculoskeletal: Positive for arthralgias and back pain.   Neurological: Negative for light-headedness and headaches.   Psychiatric/Behavioral: Positive for dysphoric mood. Negative for agitation, behavioral problems, confusion, decreased concentration and self-injury. The patient is not nervous/anxious.      PHYSICAL EXAM:    /90   Breastfeeding No   Abdomen: +BS, benign, no masses, soft, non-tender.  Incision: yes Well-healed, clean, dry, intact.  Bimanual exam: External genitalia appear normal.  Vagina pink, moist, rugated, laceration healed.  Uterus non-tender.  No palpable masses in adnexa.   Extremities: No deep calf tenderness.  Postpartum Depression Screening Questionnaire: performed prior and she declines treatment.      IMPRESSION/PLAN:  41 y.o.  s/p , 6 weeks postpartum.  Doing well.    - Hypertension- continue procardia  -  Fibromyalgia- Continue neurontin QhS and flexeril PRN  - Recovered nicely from her delivery  - Return to normal physical activity  - Contraception: contraceptive methods: Vasectomy   - Resume annual gynecological examinations  - Will establish care with PCP for further treatment of fibromyalgia and hypertension    Omar Gallegos MD  10/20/2021

## 2021-10-27 DIAGNOSIS — Z01.419 PAP TEST, AS PART OF ROUTINE GYNECOLOGICAL EXAMINATION: ICD-10-CM

## 2021-11-09 ENCOUNTER — TELEPHONE (OUTPATIENT)
Dept: OBSTETRICS AND GYNECOLOGY | Facility: CLINIC | Age: 41
End: 2021-11-09

## 2021-11-09 NOTE — TELEPHONE ENCOUNTER
Pt called and stated that at her last ap she was supposed to be referred to a general practitioner and she still hasn't heard anything about this

## 2021-11-09 NOTE — TELEPHONE ENCOUNTER
S/w pt she states she has not heard from a PCP to make an appt and per referrrals tab someone contacted patient and said she is already established with Dr. Mcelroy. I told pt. This and she states she never spoke with anyone about this. I gave patient Dr. Bolivar number and advised her to just call tomorrow morning when they open to make a PCP appointment and referral from Dr. Gallegos is already in system.

## 2021-12-01 RX ORDER — CYCLOBENZAPRINE HCL 10 MG
TABLET ORAL
Qty: 90 TABLET | Refills: 0 | Status: SHIPPED | OUTPATIENT
Start: 2021-12-01 | End: 2022-03-03

## 2021-12-01 RX ORDER — HYDROXYZINE PAMOATE 50 MG/1
CAPSULE ORAL
Qty: 90 CAPSULE | Refills: 1 | Status: SHIPPED | OUTPATIENT
Start: 2021-12-01 | End: 2022-04-07 | Stop reason: SDUPTHER

## 2022-03-03 RX ORDER — CYCLOBENZAPRINE HCL 10 MG
TABLET ORAL
Qty: 90 TABLET | Refills: 0 | Status: SHIPPED | OUTPATIENT
Start: 2022-03-03 | End: 2022-04-07 | Stop reason: SDUPTHER

## 2022-04-04 DIAGNOSIS — Z98.891 STATUS POST CESAREAN SECTION: ICD-10-CM

## 2022-04-04 RX ORDER — NIFEDIPINE 30 MG/1
TABLET, EXTENDED RELEASE ORAL
Qty: 30 TABLET | Refills: 1 | OUTPATIENT
Start: 2022-04-04

## 2022-04-04 RX ORDER — HYDROXYZINE PAMOATE 50 MG/1
CAPSULE ORAL
Qty: 90 CAPSULE | Refills: 1 | OUTPATIENT
Start: 2022-04-04

## 2022-04-04 RX ORDER — GABAPENTIN 300 MG/1
CAPSULE ORAL
Qty: 30 CAPSULE | OUTPATIENT
Start: 2022-04-04

## 2022-04-07 ENCOUNTER — TELEPHONE (OUTPATIENT)
Dept: FAMILY MEDICINE CLINIC | Facility: CLINIC | Age: 42
End: 2022-04-07

## 2022-04-07 ENCOUNTER — OFFICE VISIT (OUTPATIENT)
Dept: FAMILY MEDICINE CLINIC | Facility: CLINIC | Age: 42
End: 2022-04-07

## 2022-04-07 ENCOUNTER — LAB (OUTPATIENT)
Dept: LAB | Facility: HOSPITAL | Age: 42
End: 2022-04-07

## 2022-04-07 VITALS
DIASTOLIC BLOOD PRESSURE: 86 MMHG | SYSTOLIC BLOOD PRESSURE: 122 MMHG | WEIGHT: 161.4 LBS | HEART RATE: 89 BPM | OXYGEN SATURATION: 99 % | HEIGHT: 68 IN | TEMPERATURE: 97.6 F | BODY MASS INDEX: 24.46 KG/M2

## 2022-04-07 DIAGNOSIS — M79.7 FIBROMYALGIA: ICD-10-CM

## 2022-04-07 DIAGNOSIS — Z00.00 WELL ADULT EXAM: Primary | ICD-10-CM

## 2022-04-07 DIAGNOSIS — G47.00 INSOMNIA, UNSPECIFIED TYPE: ICD-10-CM

## 2022-04-07 DIAGNOSIS — O13.9 GESTATIONAL HYPERTENSION, ANTEPARTUM: ICD-10-CM

## 2022-04-07 LAB
ALBUMIN SERPL-MCNC: 4 G/DL (ref 3.5–5.2)
ALBUMIN/GLOB SERPL: 1.4 G/DL
ALP SERPL-CCNC: 67 U/L (ref 39–117)
ALT SERPL W P-5'-P-CCNC: 6 U/L (ref 1–33)
ANION GAP SERPL CALCULATED.3IONS-SCNC: 8.4 MMOL/L (ref 5–15)
AST SERPL-CCNC: 10 U/L (ref 1–32)
BASOPHILS # BLD AUTO: 0.04 10*3/MM3 (ref 0–0.2)
BASOPHILS NFR BLD AUTO: 0.8 % (ref 0–1.5)
BILIRUB SERPL-MCNC: 0.2 MG/DL (ref 0–1.2)
BUN SERPL-MCNC: 12 MG/DL (ref 6–20)
BUN/CREAT SERPL: 16 (ref 7–25)
CALCIUM SPEC-SCNC: 8.7 MG/DL (ref 8.6–10.5)
CHLORIDE SERPL-SCNC: 109 MMOL/L (ref 98–107)
CHOLEST SERPL-MCNC: 175 MG/DL (ref 0–200)
CO2 SERPL-SCNC: 23.6 MMOL/L (ref 22–29)
CREAT SERPL-MCNC: 0.75 MG/DL (ref 0.57–1)
DEPRECATED RDW RBC AUTO: 39.9 FL (ref 37–54)
EGFRCR SERPLBLD CKD-EPI 2021: 102.1 ML/MIN/1.73
EOSINOPHIL # BLD AUTO: 0.07 10*3/MM3 (ref 0–0.4)
EOSINOPHIL NFR BLD AUTO: 1.5 % (ref 0.3–6.2)
ERYTHROCYTE [DISTWIDTH] IN BLOOD BY AUTOMATED COUNT: 13.4 % (ref 12.3–15.4)
GLOBULIN UR ELPH-MCNC: 2.9 GM/DL
GLUCOSE SERPL-MCNC: 96 MG/DL (ref 65–99)
HBA1C MFR BLD: 5.4 % (ref 4.8–5.6)
HCT VFR BLD AUTO: 38.4 % (ref 34–46.6)
HDLC SERPL-MCNC: 48 MG/DL (ref 40–60)
HGB BLD-MCNC: 12.5 G/DL (ref 12–15.9)
IMM GRANULOCYTES # BLD AUTO: 0.02 10*3/MM3 (ref 0–0.05)
IMM GRANULOCYTES NFR BLD AUTO: 0.4 % (ref 0–0.5)
LDLC SERPL CALC-MCNC: 112 MG/DL (ref 0–100)
LDLC/HDLC SERPL: 2.31 {RATIO}
LYMPHOCYTES # BLD AUTO: 1.01 10*3/MM3 (ref 0.7–3.1)
LYMPHOCYTES NFR BLD AUTO: 21.3 % (ref 19.6–45.3)
MCH RBC QN AUTO: 26.9 PG (ref 26.6–33)
MCHC RBC AUTO-ENTMCNC: 32.6 G/DL (ref 31.5–35.7)
MCV RBC AUTO: 82.6 FL (ref 79–97)
MONOCYTES # BLD AUTO: 0.41 10*3/MM3 (ref 0.1–0.9)
MONOCYTES NFR BLD AUTO: 8.6 % (ref 5–12)
NEUTROPHILS NFR BLD AUTO: 3.2 10*3/MM3 (ref 1.7–7)
NEUTROPHILS NFR BLD AUTO: 67.4 % (ref 42.7–76)
NRBC BLD AUTO-RTO: 0 /100 WBC (ref 0–0.2)
PLATELET # BLD AUTO: 350 10*3/MM3 (ref 140–450)
PMV BLD AUTO: 11.3 FL (ref 6–12)
POTASSIUM SERPL-SCNC: 4.3 MMOL/L (ref 3.5–5.2)
PROT SERPL-MCNC: 6.9 G/DL (ref 6–8.5)
RBC # BLD AUTO: 4.65 10*6/MM3 (ref 3.77–5.28)
SODIUM SERPL-SCNC: 141 MMOL/L (ref 136–145)
TRIGL SERPL-MCNC: 80 MG/DL (ref 0–150)
TSH SERPL DL<=0.05 MIU/L-ACNC: 0.67 UIU/ML (ref 0.27–4.2)
VLDLC SERPL-MCNC: 15 MG/DL (ref 5–40)
WBC NRBC COR # BLD: 4.75 10*3/MM3 (ref 3.4–10.8)

## 2022-04-07 PROCEDURE — 80050 GENERAL HEALTH PANEL: CPT | Performed by: FAMILY MEDICINE

## 2022-04-07 PROCEDURE — 99396 PREV VISIT EST AGE 40-64: CPT | Performed by: FAMILY MEDICINE

## 2022-04-07 PROCEDURE — 83036 HEMOGLOBIN GLYCOSYLATED A1C: CPT | Performed by: FAMILY MEDICINE

## 2022-04-07 PROCEDURE — 80061 LIPID PANEL: CPT | Performed by: FAMILY MEDICINE

## 2022-04-07 RX ORDER — NIFEDIPINE 30 MG/1
30 TABLET, EXTENDED RELEASE ORAL DAILY
Qty: 30 TABLET | Refills: 1 | Status: SHIPPED | OUTPATIENT
Start: 2022-04-07 | End: 2022-06-06

## 2022-04-07 RX ORDER — CYCLOBENZAPRINE HCL 10 MG
10 TABLET ORAL 3 TIMES DAILY PRN
Qty: 90 TABLET | Refills: 2 | Status: SHIPPED | OUTPATIENT
Start: 2022-04-07 | End: 2022-07-14 | Stop reason: SDUPTHER

## 2022-04-07 RX ORDER — IBUPROFEN 600 MG/1
600 TABLET ORAL EVERY 6 HOURS PRN
Qty: 30 TABLET | Refills: 1 | Status: SHIPPED | OUTPATIENT
Start: 2022-04-07 | End: 2022-06-06

## 2022-04-07 RX ORDER — HYDROXYZINE PAMOATE 50 MG/1
50 CAPSULE ORAL
Qty: 90 CAPSULE | Refills: 1 | Status: SHIPPED | OUTPATIENT
Start: 2022-04-07 | End: 2022-07-14 | Stop reason: CLARIF

## 2022-04-07 RX ORDER — GABAPENTIN 300 MG/1
300 CAPSULE ORAL 3 TIMES DAILY
Qty: 90 CAPSULE | Refills: 2 | Status: SHIPPED | OUTPATIENT
Start: 2022-04-07 | End: 2022-07-14 | Stop reason: SDUPTHER

## 2022-04-07 NOTE — TELEPHONE ENCOUNTER
Reason for Call: [facility] IS CALLING REGARDING PRESCRIPTION FOR IBUPROFEN 600MG. THEY STATE THAT PT REPORTED AN ALLERGY TO IBUPROFEN TO THEM IN 2018 SO THEY CAN'T DISPENSE MEDICATION. PLEASE ADVISE.      Symptoms:     Onset (when it began):    Does anything make it better /  worse?    Have they tried anything?    Other pertinent info:    Okay to wait for PCP to address

## 2022-04-07 NOTE — PROGRESS NOTES
Melissa Hinton is a 42 y.o. female who presents today for a well woman exam.    Chief Complaint   Patient presents with   • Annual Exam        Last pap smear 6 month ago, results were abnormal. Recommended repeat in 6 months. Patient has appointment with OBGYN later this month. No family history of cervical, ovarian, or uterine cancer.     Sexually active with one male partner (fiance only). No vaginal discharge, itching, dysuria, or pelvic pain. Not interested in screening for STIs.     Last mammogram 6 year ago, results were abnormal. Recommended 6 month follow-up which was missed. Family history of breast cancer in mom and sister.     Diet is regular cooks a lot at home.     Physical activity includes being active but no specific exercise.     Sleep problems getting to sleep but no problems staying asleep. She takes melatonin and tylenol pm with some relief. Average hours per night 7-8. She has taken Elavil, seroquel in the past. She has taken gabapentin for fibromyalgia and helped her sleep.  Fibromyalgia symptoms have been much better controlled since she has been taking gabapentin regularly.    No mood problems anxiety which is chronic.       PHQ-2/PHQ-9 Depression Screening 4/7/2022   Retired Total Score -   Little Interest or Pleasure in Doing Things 0-->not at all   Feeling Down, Depressed or Hopeless 0-->not at all   PHQ-9: Brief Depression Severity Measure Score 0       Review of Systems   Constitutional: Negative for fever and unexpected weight loss.   HENT: Negative for congestion, ear pain and sore throat.    Eyes: Negative for visual disturbance.   Respiratory: Negative for cough, shortness of breath and wheezing.    Cardiovascular: Negative for chest pain and palpitations.   Gastrointestinal: Negative for abdominal pain, blood in stool, constipation, diarrhea, nausea, vomiting and GERD.   Endocrine: Negative for polydipsia and polyuria.   Genitourinary: Negative for difficulty urinating.    Musculoskeletal: Negative for joint swelling.   Skin: Negative for rash and skin lesions.   Allergic/Immunologic: Negative for environmental allergies.   Neurological: Negative for seizures and syncope.   Hematological: Does not bruise/bleed easily.   Psychiatric/Behavioral: Negative for suicidal ideas.        Health Maintenance   Topic Date Due   • INFLUENZA VACCINE  2022   • TDAP/TD VACCINES (3 - Td or Tdap) 2022   • PAP SMEAR  10/20/2024       Obstetric History:  OB History        6    Para   2    Term   1       1    AB   4    Living   2       SAB   1    IAB   3    Ectopic   0    Molar   0    Multiple   0    Live Births   2          Obstetric Comments   D&C              Menstrual History:     No LMP recorded.         Past Medical History:   Diagnosis Date   • Anemia     OF CHRONIC DISORDER   • Anxiety    • Arthritis    • Clotting disorder    • GERD (gastroesophageal reflux disease)    • Hepatitis C    • Hiatal hernia    • Migraines         Past Surgical History:   Procedure Laterality Date   •  SECTION N/A 2021    Procedure:  SECTION PRIMARY;  Surgeon: Omar Gallegos MD;  Location: [facility] LABOR DELIVERY;  Service: Obstetrics/Gynecology;  Laterality: N/A;   • CHOLECYSTECTOMY     • WISDOM TOOTH EXTRACTION          Family History   Problem Relation Age of Onset   • Hypertension Mother    • Heart attack Mother    • Breast cancer Mother 38   • Hypertension Father    • Diabetes Father    • Breast cancer Sister 38   • No Known Problems Daughter    • Heart attack Maternal Grandmother    • Stroke Maternal Grandmother    • Lung cancer Maternal Grandfather    • Dementia Paternal Grandmother    • Diabetes Paternal Grandfather    • Dementia Paternal Grandfather         Social History     Socioeconomic History   • Marital status:    Tobacco Use   • Smoking status: Current Every Day Smoker     Packs/day: 1.00     Years: 21.00     Pack years: 21.00   •  Smokeless tobacco: Never Used   Vaping Use   • Vaping Use: Never used   Substance and Sexual Activity   • Alcohol use: No   • Drug use: Not Currently     Types: Heroin, 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     • butalbital-acetaminophen-caffeine (Fioricet) -40 MG capsule capsule Take 1 capsule by mouth Every 6 (Six) Hours As Needed (migraine headache). 20 capsule 0   • Prenatal Vit-Fe Fumarate-FA (prenatal vitamin 27-0.8) 27-0.8 MG tablet tablet      • [DISCONTINUED] cyclobenzaprine (FLEXERIL) 10 MG tablet TAKE ONE TABLET BY MOUTH THREE TIMES A DAY AS NEEDED FOR MUSCLE SPASMS 90 tablet 0   • [DISCONTINUED] hydrOXYzine pamoate (VISTARIL) 50 MG capsule TAKE ONE CAPSULE BY MOUTH THREE TIMES A DAY AS NEEDED   FOR ITCHING FOR UP TO 30 DAYS 90 capsule 1   • [DISCONTINUED] ibuprofen (ADVIL,MOTRIN) 600 MG tablet Take 1 tablet by mouth Every 6 (Six) Hours As Needed for Mild Pain . 30 tablet 1   • [DISCONTINUED] labetalol (NORMODYNE) 200 MG tablet Take 2 tablets by mouth 3 (Three) Times a Day. 90 tablet 1   • [DISCONTINUED] naproxen sodium (ALEVE) 220 MG tablet Take 220 mg by mouth 2 (Two) Times a Day As Needed.     • [DISCONTINUED] NIFEdipine XL (Procardia XL) 30 MG 24 hr tablet Take 1 tablet by mouth Daily. 30 tablet 1   • [DISCONTINUED] gabapentin (Neurontin) 300 MG capsule Take 1 capsule by mouth At Night As Needed (pain) for up to 30 days. Take once daily on day 1, then BID on day 2, then TID 30 capsule 0     No current facility-administered medications on file prior to visit.       Allergies   Allergen Reactions   • Nsaids Unknown - High Severity   • Tramadol Hives     migraine   •  "Tylenol With Codeine #3 [Acetaminophen-Codeine] Arrhythmia   • Keflex [Cephalexin] Other (See Comments)     Gives her a headache   • Monistat [Miconazole] Swelling   • Prenatal + Complete Multi Headache        Visit Vitals  /86   Pulse 89   Temp 97.6 °F (36.4 °C)   Ht 172.7 cm (67.99\")   Wt 73.2 kg (161 lb 6.4 oz)   SpO2 99%   BMI 24.55 kg/m²        Physical Exam  Constitutional:       General: She is not in acute distress.     Appearance: She is well-developed. She is not diaphoretic.   HENT:      Head: Atraumatic.   Cardiovascular:      Rate and Rhythm: Normal rate and regular rhythm.      Heart sounds: Normal heart sounds. No murmur heard.    No friction rub. No gallop.   Pulmonary:      Effort: Pulmonary effort is normal. No respiratory distress.      Breath sounds: Normal breath sounds. No stridor. No wheezing, rhonchi or rales.   Musculoskeletal:      Cervical back: Normal range of motion and neck supple.   Skin:     General: Skin is warm and dry.   Neurological:      Mental Status: She is alert and oriented to person, place, and time.   Psychiatric:         Behavior: Behavior normal.               Immunization History   Administered Date(s) Administered   • Tdap 2012       Problems Addressed this Visit        Gravid and     Gestational hypertension     Blood pressure is currently controlled.  She states that when she does not take the nifedipine her blood pressure elevates significantly.  We will continue nifedipine at this time.  Will reevaluate in 3 months.           Relevant Medications    NIFEdipine XL (Procardia XL) 30 MG 24 hr tablet       Health Encounters    Well adult exam - Primary     The patient is here for health maintenance visit.  Currently, the patient consumes a healthy diet and has an inadequate exercise regimen.  Screening lab work is ordered.  Immunizations were reviewed today.  Advice and education was given regarding nutrition, aerobic exercise, routine dental " evaluations, routine eye exams, reproductive health, cardiovascular risk reduction, sunscreen use, self skin examination (annual dermatology evaluations) and seatbelt use (general overall safety).  Further recommendations will be given if needed after lab evaluation.  Annual wellness evaluation is recommended.             Relevant Orders    CBC & Differential    Comprehensive Metabolic Panel    Hemoglobin A1c    Lipid Panel    TSH Rfx On Abnormal To Free T4       Musculoskeletal and Injuries    Fibromyalgia     Chronic and stable.  Patient had significant relief of symptoms with gabapentin 300 mg 3 times daily during pregnancy.  She is interested in continuing this medication.  We will begin prescribing.  CSA agreement and UDS were obtained today.  Raad was reviewed and appropriate.  Patient was given refill on gabapentin.  We will follow up in 3 months.           Relevant Medications    cyclobenzaprine (FLEXERIL) 10 MG tablet    ibuprofen (ADVIL,MOTRIN) 600 MG tablet    gabapentin (Neurontin) 300 MG capsule    Other Relevant Orders    Compliance Drug Analysis, Ur - Urine, Clean Catch       Sleep    Insomnia     Patient struggles to fall asleep but is able to stay asleep once she does fall asleep.  She has been taking hydroxyzine nightly which has helped with this.  We will continue hydroxyzine and gabapentin nightly.           Relevant Medications    hydrOXYzine pamoate (VISTARIL) 50 MG capsule      Diagnoses       Codes Comments    Well adult exam    -  Primary ICD-10-CM: Z00.00  ICD-9-CM: V70.0     Gestational hypertension, antepartum     ICD-10-CM: O13.9  ICD-9-CM: 642.33     Fibromyalgia     ICD-10-CM: M79.7  ICD-9-CM: 729.1     Insomnia, unspecified type     ICD-10-CM: G47.00  ICD-9-CM: 780.52           Patient's Body mass index is 24.55 kg/m².      Return in about 3 months (around 7/7/2022) for Follow-up fibromyalgia, HTN.    Brock Mcelroy MD  4/7/2022

## 2022-04-07 NOTE — ASSESSMENT & PLAN NOTE
Patient struggles to fall asleep but is able to stay asleep once she does fall asleep.  She has been taking hydroxyzine nightly which has helped with this.  We will continue hydroxyzine and gabapentin nightly.

## 2022-04-07 NOTE — ASSESSMENT & PLAN NOTE
Blood pressure is currently controlled.  She states that when she does not take the nifedipine her blood pressure elevates significantly.  We will continue nifedipine at this time.  Will reevaluate in 3 months.

## 2022-04-07 NOTE — ASSESSMENT & PLAN NOTE
Chronic and stable.  Patient had significant relief of symptoms with gabapentin 300 mg 3 times daily during pregnancy.  She is interested in continuing this medication.  We will begin prescribing.  CSA agreement and UDS were obtained today.  Raad was reviewed and appropriate.  Patient was given refill on gabapentin.  We will follow up in 3 months.

## 2022-04-08 NOTE — TELEPHONE ENCOUNTER
Please inform pharmacy the patient does not have an allergy to this and has been taking this medication for several months.

## 2022-04-13 LAB — DRUGS UR: NORMAL

## 2022-06-02 NOTE — TELEPHONE ENCOUNTER
Called in Rx and LVM for pt to return call so that we can notify her. CW   Psych/Behavioral (Pediatric)

## 2022-06-04 DIAGNOSIS — M79.7 FIBROMYALGIA: ICD-10-CM

## 2022-06-04 DIAGNOSIS — O13.9 GESTATIONAL HYPERTENSION, ANTEPARTUM: ICD-10-CM

## 2022-06-06 RX ORDER — NIFEDIPINE 30 MG/1
TABLET, EXTENDED RELEASE ORAL
Qty: 30 TABLET | Refills: 1 | Status: SHIPPED | OUTPATIENT
Start: 2022-06-06 | End: 2022-07-14 | Stop reason: SDUPTHER

## 2022-06-06 RX ORDER — IBUPROFEN 600 MG/1
TABLET ORAL
Qty: 30 TABLET | Refills: 1 | Status: SHIPPED | OUTPATIENT
Start: 2022-06-06

## 2022-07-14 ENCOUNTER — OFFICE VISIT (OUTPATIENT)
Dept: FAMILY MEDICINE CLINIC | Facility: CLINIC | Age: 42
End: 2022-07-14

## 2022-07-14 VITALS
BODY MASS INDEX: 25.01 KG/M2 | SYSTOLIC BLOOD PRESSURE: 160 MMHG | TEMPERATURE: 97 F | HEART RATE: 87 BPM | WEIGHT: 165 LBS | OXYGEN SATURATION: 99 % | DIASTOLIC BLOOD PRESSURE: 100 MMHG | HEIGHT: 68 IN

## 2022-07-14 DIAGNOSIS — M79.7 FIBROMYALGIA: ICD-10-CM

## 2022-07-14 DIAGNOSIS — F51.01 PRIMARY INSOMNIA: ICD-10-CM

## 2022-07-14 DIAGNOSIS — J01.00 ACUTE NON-RECURRENT MAXILLARY SINUSITIS: ICD-10-CM

## 2022-07-14 DIAGNOSIS — I10 PRIMARY HYPERTENSION: Primary | ICD-10-CM

## 2022-07-14 PROCEDURE — 99214 OFFICE O/P EST MOD 30 MIN: CPT | Performed by: FAMILY MEDICINE

## 2022-07-14 RX ORDER — GABAPENTIN 300 MG/1
300 CAPSULE ORAL 3 TIMES DAILY
Qty: 90 CAPSULE | Refills: 2 | Status: SHIPPED | OUTPATIENT
Start: 2022-07-14 | End: 2022-07-14

## 2022-07-14 RX ORDER — LORATADINE 10 MG/1
10 TABLET ORAL DAILY
Qty: 30 TABLET | Refills: 3 | Status: SHIPPED | OUTPATIENT
Start: 2022-07-14 | End: 2022-07-14

## 2022-07-14 RX ORDER — FLUTICASONE PROPIONATE 50 MCG
2 SPRAY, SUSPENSION (ML) NASAL DAILY
Qty: 16 G | Refills: 1 | Status: SHIPPED | OUTPATIENT
Start: 2022-07-14 | End: 2022-07-14

## 2022-07-14 RX ORDER — FLUCONAZOLE 150 MG/1
150 TABLET ORAL ONCE
Qty: 2 TABLET | Refills: 0 | Status: SHIPPED | OUTPATIENT
Start: 2022-07-14 | End: 2022-07-14

## 2022-07-14 RX ORDER — GABAPENTIN 300 MG/1
300 CAPSULE ORAL 3 TIMES DAILY
Qty: 90 CAPSULE | Refills: 2 | Status: SHIPPED | OUTPATIENT
Start: 2022-07-14

## 2022-07-14 RX ORDER — LORATADINE 10 MG/1
10 TABLET ORAL DAILY
Qty: 30 TABLET | Refills: 3 | Status: SHIPPED | OUTPATIENT
Start: 2022-07-14

## 2022-07-14 RX ORDER — NORTRIPTYLINE HYDROCHLORIDE 50 MG/1
50 CAPSULE ORAL NIGHTLY
Qty: 30 CAPSULE | Refills: 3 | Status: SHIPPED | OUTPATIENT
Start: 2022-07-14

## 2022-07-14 RX ORDER — NORTRIPTYLINE HYDROCHLORIDE 50 MG/1
50 CAPSULE ORAL NIGHTLY
Qty: 30 CAPSULE | Refills: 3 | Status: SHIPPED | OUTPATIENT
Start: 2022-07-14 | End: 2022-07-14

## 2022-07-14 RX ORDER — AMOXICILLIN AND CLAVULANATE POTASSIUM 875; 125 MG/1; MG/1
1 TABLET, FILM COATED ORAL 2 TIMES DAILY
Qty: 14 TABLET | Refills: 0 | Status: SHIPPED | OUTPATIENT
Start: 2022-07-14 | End: 2022-07-14

## 2022-07-14 RX ORDER — AMOXICILLIN AND CLAVULANATE POTASSIUM 875; 125 MG/1; MG/1
1 TABLET, FILM COATED ORAL 2 TIMES DAILY
Qty: 14 TABLET | Refills: 0 | Status: SHIPPED | OUTPATIENT
Start: 2022-07-14 | End: 2022-07-21

## 2022-07-14 RX ORDER — CYCLOBENZAPRINE HCL 10 MG
10 TABLET ORAL 3 TIMES DAILY PRN
Qty: 90 TABLET | Refills: 2 | Status: SHIPPED | OUTPATIENT
Start: 2022-07-14

## 2022-07-14 RX ORDER — CYCLOBENZAPRINE HCL 10 MG
10 TABLET ORAL 3 TIMES DAILY PRN
Qty: 90 TABLET | Refills: 2 | Status: SHIPPED | OUTPATIENT
Start: 2022-07-14 | End: 2022-07-14

## 2022-07-14 RX ORDER — FLUTICASONE PROPIONATE 50 MCG
2 SPRAY, SUSPENSION (ML) NASAL DAILY
Qty: 16 G | Refills: 1 | Status: SHIPPED | OUTPATIENT
Start: 2022-07-14

## 2022-07-14 RX ORDER — NIFEDIPINE 60 MG/1
60 TABLET, EXTENDED RELEASE ORAL DAILY
Qty: 90 TABLET | Refills: 3 | Status: SHIPPED | OUTPATIENT
Start: 2022-07-14

## 2022-07-14 NOTE — PROGRESS NOTES
Melissa Hinton is a 42 y.o. female who presents today for Hypertension and Fibromyalgia      Patient is here to follow-up on HTN and fibromyalgia. She has not been checking her blood pressure at home. She has been taking nifedipine for blood pressure. She has chest pain on and off with anxiety but nothing consistent, worsening, or with exertion. She denies palpitations, SOA, headaches, dizziness, tinnitus, facial flushing, and vision changes. She has been under increasing stress lately and has not been sleeping well. She has taking nortriptyline in the past which worked better for sleep. She has Her and her fiance , she lost her dog, and her cousin passed away. She has not been sleeping well even with gabapentin and vistaril. She has been out of gabapentin for a week and can tell her fibromyalgia symptoms have worsened since being off of it. She states it was working well for her before she ran out of it. She has also has congestion, sinus pain, and post nasal drip for the last two weeks. She has a mild cough. She denies eye pain, fever, sore throat, ear pain, chills, N/V/D/C, or myalgias. She has been taking ibuprofen cold and sinus which helps to clear her up enough to breath at night but has not cleared congestion.      The following portions of the patient's history were reviewed and updated as appropriate: allergies, current medications, past family history, past medical history, past social history, past surgical history and problem list.    Current Outpatient Medications on File Prior to Visit   Medication Sig Dispense Refill   • acetaminophen (TYLENOL) 500 MG tablet Take 500 mg by mouth Every 6 (Six) Hours As Needed for Mild Pain .     • butalbital-acetaminophen-caffeine (Fioricet) -40 MG capsule capsule Take 1 capsule by mouth Every 6 (Six) Hours As Needed (migraine headache). 20 capsule 0   • ibuprofen (ADVIL,MOTRIN) 600 MG tablet TAKE ONE TABLET BY MOUTH EVERY 6 HOURS AS NEEDED FOR  "MILD PAIN 30 tablet 1   • Prenatal Vit-Fe Fumarate-FA (prenatal vitamin 27-0.8) 27-0.8 MG tablet tablet      • [DISCONTINUED] cyclobenzaprine (FLEXERIL) 10 MG tablet Take 1 tablet by mouth 3 (Three) Times a Day As Needed for Muscle Spasms. 90 tablet 2   • [DISCONTINUED] hydrOXYzine pamoate (VISTARIL) 50 MG capsule Take 1 capsule by mouth every night at bedtime. 90 capsule 1   • [DISCONTINUED] NIFEdipine XL (PROCARDIA XL) 30 MG 24 hr tablet TAKE ONE TABLET BY MOUTH DAILY 30 tablet 1   • [DISCONTINUED] gabapentin (Neurontin) 300 MG capsule Take 1 capsule by mouth 3 (Three) Times a Day for 30 days. Take once daily on day 1, then BID on day 2, then TID 90 capsule 2     No current facility-administered medications on file prior to visit.       Allergies   Allergen Reactions   • Nsaids Unknown - High Severity   • Tramadol Hives     migraine   • Tylenol With Codeine #3 [Acetaminophen-Codeine] Arrhythmia   • Keflex [Cephalexin] Other (See Comments)     Gives her a headache   • Monistat [Miconazole] Swelling   • Prenatal + Complete Multi Headache        Visit Vitals  /100   Pulse 87   Temp 97 °F (36.1 °C)   Ht 172.7 cm (67.99\")   Wt 74.8 kg (165 lb)   SpO2 99%   BMI 25.10 kg/m²        Physical Exam  Constitutional:       General: She is not in acute distress.     Appearance: She is well-developed. She is not diaphoretic.   HENT:      Head: Atraumatic.      Right Ear: Hearing, ear canal and external ear normal. A middle ear effusion is present. There is no impacted cerumen. Tympanic membrane is not injected, scarred, perforated, erythematous, retracted or bulging.      Left Ear: Hearing, ear canal and external ear normal. A middle ear effusion is present. There is no impacted cerumen. Tympanic membrane is not injected, scarred, perforated, erythematous, retracted or bulging.      Nose:      Right Sinus: Maxillary sinus tenderness present. No frontal sinus tenderness.      Left Sinus: Maxillary sinus tenderness present. " No frontal sinus tenderness.      Mouth/Throat:      Pharynx: No pharyngeal swelling, posterior oropharyngeal erythema or uvula swelling.      Tonsils: No tonsillar exudate or tonsillar abscesses.   Cardiovascular:      Rate and Rhythm: Normal rate and regular rhythm.      Heart sounds: Normal heart sounds. No murmur heard.    No friction rub. No gallop.   Pulmonary:      Effort: Pulmonary effort is normal. No respiratory distress.      Breath sounds: Normal breath sounds. No wheezing or rales.   Abdominal:      General: Bowel sounds are normal. There is no distension.      Palpations: Abdomen is soft.      Tenderness: There is no abdominal tenderness.   Musculoskeletal:      Cervical back: Normal range of motion and neck supple.   Skin:     General: Skin is warm and dry.   Neurological:      Mental Status: She is alert and oriented to person, place, and time.   Psychiatric:         Behavior: Behavior normal.               Problems Addressed this Visit        Cardiac and Vasculature    Primary hypertension - Primary     Hypertension is worsening.  Dietary sodium restriction.  Regular aerobic exercise.  Stop smoking.  Medication changes per orders.  Will increase nifedipine to 60 mg daily.  If this fails to control blood pressure we will discuss starting second medication at follow-up visit.  Ambulatory blood pressure monitoring.  Blood pressure will be reassessed in 3 months.  RTC/ED precautions given.           Relevant Medications    NIFEdipine XL (PROCARDIA XL) 60 MG 24 hr tablet       ENT    Acute non-recurrent maxillary sinusitis     Patient signs symptoms concerning for acute bacterial sinusitis.  Patient was given prescription for Flonase and Claritin as well as Augmentin.  RTC/ED precautions given.           Relevant Medications    amoxicillin-clavulanate (Augmentin) 875-125 MG per tablet    fluconazole (Diflucan) 150 MG tablet    fluticasone (Flonase) 50 MCG/ACT nasal spray    loratadine (CLARITIN) 10 MG  tablet       Musculoskeletal and Injuries    Fibromyalgia     The patient is taking the following controlled substance(s) on a long term (greater than three months) basis: Gabapentin.  She is taking controlled substances for other (Fibromyalgia).  A history was obtained from the patient and the following were reviewed: family history, social history, psychiatric history, and substance abuse history.  A baseline assessment was performed and includes a controlled substance agreement, urine drug screen, SNEHAL (within 12 months prior to today's encounter), and a physical exam, if appropriate, was performed within the past year.  It is medically appropriate to prescribe her the medication(s) above.  If applicable, prior treatment records were obtained and reviewed to justify long term prescribing of controlled substances.  The patient was educated on the following: Limited use of the medication, need to discontinue the medication when her condition resolves, proper storage and disposal of medication(s), and risks and dangers associated with controlled substances including tolerance, dependence, and addiction.  A written informed consent was obtained and includes objectives of treatment, further diagnostic testing if appropriate, and an exit strategy for discontinuing the medication on the condition resolves, if appropriate.  In addition, if appropriate, the patient will be screened for other medical conditions that may impact the prescribing of controlled substances.  Previous treatments have been tried including trials of noncontrolled substances or lower doses of controlled substances.  The controlled medication(s) have been titrated to the level appropriate and necessary and the medication(s) are not causing unacceptable side effects.               Relevant Medications    cyclobenzaprine (FLEXERIL) 10 MG tablet    gabapentin (Neurontin) 300 MG capsule       Sleep    Insomnia     Patient has not been able to sleep  through the night and feels that the Vistaril is no longer helping with sleep.  Patient had success with nortriptyline in the past and would like to restart this.  Patient counseled on additive effects of nortriptyline and gabapentin.  Patient understands risk and would like to proceed with nortriptyline prescription.           Relevant Medications    nortriptyline (Pamelor) 50 MG capsule      Diagnoses       Codes Comments    Primary hypertension    -  Primary ICD-10-CM: I10  ICD-9-CM: 401.9     Fibromyalgia     ICD-10-CM: M79.7  ICD-9-CM: 729.1     Primary insomnia     ICD-10-CM: F51.01  ICD-9-CM: 307.42     Acute non-recurrent maxillary sinusitis     ICD-10-CM: J01.00  ICD-9-CM: 461.0           Return in about 3 months (around 10/14/2022) for Follow-up HTN, fibromyalgia, insomnia.    Brock Mcelroy MD   7/14/2022

## 2022-07-14 NOTE — ASSESSMENT & PLAN NOTE
Patient signs symptoms concerning for acute bacterial sinusitis.  Patient was given prescription for Flonase and Claritin as well as Augmentin.  RTC/ED precautions given.

## 2022-07-14 NOTE — ASSESSMENT & PLAN NOTE
The patient is taking the following controlled substance(s) on a long term (greater than three months) basis: Gabapentin.  She is taking controlled substances for other (Fibromyalgia).  A history was obtained from the patient and the following were reviewed: family history, social history, psychiatric history, and substance abuse history.  A baseline assessment was performed and includes a controlled substance agreement, urine drug screen, SNEHAL (within 12 months prior to today's encounter), and a physical exam, if appropriate, was performed within the past year.  It is medically appropriate to prescribe her the medication(s) above.  If applicable, prior treatment records were obtained and reviewed to justify long term prescribing of controlled substances.  The patient was educated on the following: Limited use of the medication, need to discontinue the medication when her condition resolves, proper storage and disposal of medication(s), and risks and dangers associated with controlled substances including tolerance, dependence, and addiction.  A written informed consent was obtained and includes objectives of treatment, further diagnostic testing if appropriate, and an exit strategy for discontinuing the medication on the condition resolves, if appropriate.  In addition, if appropriate, the patient will be screened for other medical conditions that may impact the prescribing of controlled substances.  Previous treatments have been tried including trials of noncontrolled substances or lower doses of controlled substances.  The controlled medication(s) have been titrated to the level appropriate and necessary and the medication(s) are not causing unacceptable side effects.

## 2022-07-14 NOTE — ASSESSMENT & PLAN NOTE
Patient has not been able to sleep through the night and feels that the Vistaril is no longer helping with sleep.  Patient had success with nortriptyline in the past and would like to restart this.  Patient counseled on additive effects of nortriptyline and gabapentin.  Patient understands risk and would like to proceed with nortriptyline prescription.

## 2022-07-14 NOTE — ASSESSMENT & PLAN NOTE
Hypertension is worsening.  Dietary sodium restriction.  Regular aerobic exercise.  Stop smoking.  Medication changes per orders.  Will increase nifedipine to 60 mg daily.  If this fails to control blood pressure we will discuss starting second medication at follow-up visit.  Ambulatory blood pressure monitoring.  Blood pressure will be reassessed in 3 months.  RTC/ED precautions given.

## 2022-10-14 ENCOUNTER — TELEPHONE (OUTPATIENT)
Dept: FAMILY MEDICINE CLINIC | Facility: CLINIC | Age: 42
End: 2022-10-14

## 2022-11-02 NOTE — TELEPHONE ENCOUNTER
"Called patient, she does not know the number right off and could not find her paper work, patient is going to look for it and give us a call back. Patient states it is \"strange to her\" that they contacted our office considering she has been seeing a Neurosurgeon (Dr Valenzuela) and will be having surgery with his office soon. Per patient he filled out her FMLA forms  " Handoff received from Zuleima BELTRAN
